# Patient Record
Sex: FEMALE | Race: OTHER | Employment: FULL TIME | URBAN - METROPOLITAN AREA
[De-identification: names, ages, dates, MRNs, and addresses within clinical notes are randomized per-mention and may not be internally consistent; named-entity substitution may affect disease eponyms.]

---

## 2017-02-01 ENCOUNTER — TELEPHONE (OUTPATIENT)
Dept: INTERNAL MEDICINE CLINIC | Facility: CLINIC | Age: 47
End: 2017-02-01

## 2017-02-01 NOTE — TELEPHONE ENCOUNTER
Pt stts that she may have the flu. She wanted to see Dr. Abi Plaza today but no appts and she did not want to see anyone else tomorrow. Would  like to prescribe?

## 2017-02-04 NOTE — TELEPHONE ENCOUNTER
Multiple attempts to contact pt and messages left without response.   If she call transfer to [id number]

## 2017-03-15 ENCOUNTER — APPOINTMENT (OUTPATIENT)
Dept: LAB | Age: 47
End: 2017-03-15
Attending: INTERNAL MEDICINE
Payer: COMMERCIAL

## 2017-03-15 ENCOUNTER — HOSPITAL ENCOUNTER (OUTPATIENT)
Dept: GENERAL RADIOLOGY | Age: 47
Discharge: HOME | End: 2017-03-15
Attending: INTERNAL MEDICINE
Payer: COMMERCIAL

## 2017-03-15 ENCOUNTER — OFFICE VISIT (OUTPATIENT)
Dept: INTERNAL MEDICINE CLINIC | Facility: CLINIC | Age: 47
End: 2017-03-15

## 2017-03-15 VITALS
HEART RATE: 62 BPM | WEIGHT: 155 LBS | DIASTOLIC BLOOD PRESSURE: 80 MMHG | BODY MASS INDEX: 29.27 KG/M2 | HEIGHT: 61 IN | SYSTOLIC BLOOD PRESSURE: 126 MMHG

## 2017-03-15 DIAGNOSIS — M25.511 ACUTE PAIN OF RIGHT SHOULDER: ICD-10-CM

## 2017-03-15 DIAGNOSIS — Z12.31 VISIT FOR SCREENING MAMMOGRAM: ICD-10-CM

## 2017-03-15 DIAGNOSIS — Z00.00 ROUTINE GENERAL MEDICAL EXAMINATION AT A HEALTH CARE FACILITY: ICD-10-CM

## 2017-03-15 DIAGNOSIS — Z00.00 ROUTINE GENERAL MEDICAL EXAMINATION AT A HEALTH CARE FACILITY: Primary | ICD-10-CM

## 2017-03-15 LAB
ALBUMIN SERPL BCP-MCNC: 4.3 G/DL (ref 3.5–4.8)
ALBUMIN/GLOB SERPL: 1.4 {RATIO} (ref 1–2)
ALP SERPL-CCNC: 76 U/L (ref 32–100)
ALT SERPL-CCNC: 29 U/L (ref 14–54)
ANION GAP SERPL CALC-SCNC: 8 MMOL/L (ref 0–18)
AST SERPL-CCNC: 28 U/L (ref 15–41)
BACTERIA UR QL AUTO: NEGATIVE /HPF
BILIRUB SERPL-MCNC: 1.1 MG/DL (ref 0.3–1.2)
BILIRUB UR QL: NEGATIVE
BUN SERPL-MCNC: 10 MG/DL (ref 8–20)
BUN/CREAT SERPL: 15.2 (ref 10–20)
CALCIUM SERPL-MCNC: 9.5 MG/DL (ref 8.5–10.5)
CHLORIDE SERPL-SCNC: 104 MMOL/L (ref 95–110)
CLARITY UR: CLEAR
CO2 SERPL-SCNC: 29 MMOL/L (ref 22–32)
COLOR UR: YELLOW
CREAT SERPL-MCNC: 0.66 MG/DL (ref 0.5–1.5)
ERYTHROCYTE [DISTWIDTH] IN BLOOD BY AUTOMATED COUNT: 14.1 % (ref 11–15)
GLOBULIN PLAS-MCNC: 3.1 G/DL (ref 2.5–3.7)
GLUCOSE SERPL-MCNC: 90 MG/DL (ref 70–99)
GLUCOSE UR-MCNC: NEGATIVE MG/DL
HBA1C MFR BLD: 4.7 % (ref 4–6)
HCT VFR BLD AUTO: 39.5 % (ref 35–48)
HGB BLD-MCNC: 13.2 G/DL (ref 12–16)
HGB UR QL STRIP.AUTO: NEGATIVE
KETONES UR-MCNC: NEGATIVE MG/DL
MCH RBC QN AUTO: 29.7 PG (ref 27–32)
MCHC RBC AUTO-ENTMCNC: 33.5 G/DL (ref 32–37)
MCV RBC AUTO: 88.4 FL (ref 80–100)
NITRITE UR QL STRIP.AUTO: NEGATIVE
OSMOLALITY UR CALC.SUM OF ELEC: 291 MOSM/KG (ref 275–295)
PH UR: 6 [PH] (ref 5–8)
PLATELET # BLD AUTO: 176 K/UL (ref 140–400)
PMV BLD AUTO: 8.7 FL (ref 7.4–10.3)
POTASSIUM SERPL-SCNC: 3.8 MMOL/L (ref 3.3–5.1)
PROT SERPL-MCNC: 7.4 G/DL (ref 5.9–8.4)
PROT UR-MCNC: NEGATIVE MG/DL
RBC # BLD AUTO: 4.46 M/UL (ref 3.7–5.4)
RBC #/AREA URNS AUTO: <1 /HPF
SODIUM SERPL-SCNC: 141 MMOL/L (ref 136–144)
SP GR UR STRIP: 1.01 (ref 1–1.03)
TSH SERPL-ACNC: 3.49 UIU/ML (ref 0.34–5.6)
UROBILINOGEN UR STRIP-ACNC: <2
VIT C UR-MCNC: NEGATIVE MG/DL
WBC # BLD AUTO: 5.2 K/UL (ref 4–11)
WBC #/AREA URNS AUTO: 1 /HPF

## 2017-03-15 PROCEDURE — 36415 COLL VENOUS BLD VENIPUNCTURE: CPT

## 2017-03-15 PROCEDURE — 73030 X-RAY EXAM OF SHOULDER: CPT

## 2017-03-15 PROCEDURE — 84443 ASSAY THYROID STIM HORMONE: CPT

## 2017-03-15 PROCEDURE — 81001 URINALYSIS AUTO W/SCOPE: CPT

## 2017-03-15 PROCEDURE — 83036 HEMOGLOBIN GLYCOSYLATED A1C: CPT

## 2017-03-15 PROCEDURE — 80053 COMPREHEN METABOLIC PANEL: CPT

## 2017-03-15 PROCEDURE — 99396 PREV VISIT EST AGE 40-64: CPT | Performed by: INTERNAL MEDICINE

## 2017-03-15 PROCEDURE — 85027 COMPLETE CBC AUTOMATED: CPT

## 2017-03-27 NOTE — PROGRESS NOTES
HPI:    Patient ID: German Khoury is a 50year old female.     HPI    Physical and shoulder pain      Right shoulder pain    Aggravated by movmement   Has decrease ROM ongoing for a few days    Due for wellnes exam  Due for mammogram    /80 mmHg  P Negative for adenopathy. Does not bruise/bleed easily. Psychiatric/Behavioral: Negative for behavioral problems and agitation. No current outpatient prescriptions on file.   Allergies:  Radiology Contrast *    Hives    Comment:And itching on samantha There is no tenderness. No hernia. Musculoskeletal: She exhibits no edema. Right shoulder: She exhibits decreased range of motion and tenderness. Lymphadenopathy:     She has no cervical adenopathy. Neurological: She is alert.    Skin: No rash

## 2017-04-07 ENCOUNTER — OFFICE VISIT (OUTPATIENT)
Dept: ORTHOPEDICS CLINIC | Facility: CLINIC | Age: 47
End: 2017-04-07

## 2017-04-07 ENCOUNTER — HOSPITAL ENCOUNTER (OUTPATIENT)
Dept: GENERAL RADIOLOGY | Facility: HOSPITAL | Age: 47
Discharge: HOME | End: 2017-04-07
Attending: ORTHOPAEDIC SURGERY
Payer: COMMERCIAL

## 2017-04-07 DIAGNOSIS — R52 PAIN: ICD-10-CM

## 2017-04-07 DIAGNOSIS — M75.21 BICEPS TENDONITIS, RIGHT: Primary | ICD-10-CM

## 2017-04-07 DIAGNOSIS — M50.30 DDD (DEGENERATIVE DISC DISEASE), CERVICAL: ICD-10-CM

## 2017-04-07 PROCEDURE — 73030 X-RAY EXAM OF SHOULDER: CPT

## 2017-04-07 PROCEDURE — 72040 X-RAY EXAM NECK SPINE 2-3 VW: CPT

## 2017-04-07 PROCEDURE — 99243 OFF/OP CNSLTJ NEW/EST LOW 30: CPT | Performed by: ORTHOPAEDIC SURGERY

## 2017-04-07 PROCEDURE — 99212 OFFICE O/P EST SF 10 MIN: CPT | Performed by: ORTHOPAEDIC SURGERY

## 2017-04-07 NOTE — PROGRESS NOTES
4/7/2017  Ashley Guajardo  3/12/1970  50year old   female  Jonatan Ortiz MD    HPI:   Patient presents with:  Consult: Pt is here for right shoulder pain. Pain for 1 1/2 months. Pain is getting worse. Pt was pulling items at work.  Repetative movem Packs/Day: 0.00  Years: 5         Types: Cigarettes      Quit date: 01/01/2001    Smokeless Status: Former User                       Alcohol Use: Yes           0.0 oz/week       0 Standard drinks or equivalent per week       Comment: rare          REVIEW therapy and follow-up in 4 weeks for repeat evaluation.   If the patient continues to have pain after physical therapy, we will consider further imaging of the right shoulder or referral to see a pain management physician for further evaluation of the cervi

## 2017-05-16 ENCOUNTER — APPOINTMENT (OUTPATIENT)
Dept: GENERAL RADIOLOGY | Facility: HOSPITAL | Age: 47
End: 2017-05-16
Payer: COMMERCIAL

## 2017-05-16 ENCOUNTER — HOSPITAL ENCOUNTER (OUTPATIENT)
Facility: HOSPITAL | Age: 47
Setting detail: OBSERVATION
Discharge: HOME | End: 2017-05-17
Admitting: HOSPITALIST
Payer: COMMERCIAL

## 2017-05-16 ENCOUNTER — TELEPHONE (OUTPATIENT)
Dept: INTERNAL MEDICINE CLINIC | Facility: CLINIC | Age: 47
End: 2017-05-16

## 2017-05-16 DIAGNOSIS — R07.9 ACUTE CHEST PAIN: Primary | ICD-10-CM

## 2017-05-16 PROCEDURE — 99219 INITIAL OBSERVATION CARE,LEVL II: CPT | Performed by: HOSPITALIST

## 2017-05-16 PROCEDURE — 71020 XR CHEST PA + LAT CHEST (CPT=71020): CPT

## 2017-05-16 RX ORDER — ENOXAPARIN SODIUM 100 MG/ML
40 INJECTION SUBCUTANEOUS DAILY
Status: DISCONTINUED | OUTPATIENT
Start: 2017-05-17 | End: 2017-05-17

## 2017-05-16 RX ORDER — ACETAMINOPHEN 325 MG/1
650 TABLET ORAL EVERY 6 HOURS PRN
Status: DISCONTINUED | OUTPATIENT
Start: 2017-05-16 | End: 2017-05-17

## 2017-05-16 RX ORDER — ONDANSETRON 2 MG/ML
4 INJECTION INTRAMUSCULAR; INTRAVENOUS EVERY 6 HOURS PRN
Status: DISCONTINUED | OUTPATIENT
Start: 2017-05-16 | End: 2017-05-17

## 2017-05-16 NOTE — TELEPHONE ENCOUNTER
RN f/u call: pt en route to [facility] ER. States  driving; chest discomfort has remained the same since we last spoke. Pt advised RN to f/u with her tomorrow.

## 2017-05-16 NOTE — TELEPHONE ENCOUNTER
Actions Requested: seeking appt; pt advised ER at this time  Situation/Background   Problem: acute sternal chest discomfort   Onset: 2 hours ago   Associated Symptoms: dyspnea at rest, dizziness.    History of Same: no   Precipitated By: none   Aggravating/ old and at least one cardiac risk factor (i.e., high blood pressure, diabetes, high cholesterol, obesity, smoker or strong family history of heart disease), pain is crushing, pressure-like, or heavy, took nitroglycerin and chest pain was not relieved, hist

## 2017-05-16 NOTE — ED INITIAL ASSESSMENT (HPI)
C/o chest heaviness and pressure started approx 3 hours ago while at work accompanied with sob, and sweating.

## 2017-05-17 ENCOUNTER — APPOINTMENT (OUTPATIENT)
Dept: CV DIAGNOSTICS | Facility: HOSPITAL | Age: 47
End: 2017-05-17
Attending: HOSPITALIST
Payer: COMMERCIAL

## 2017-05-17 VITALS
BODY MASS INDEX: 29.81 KG/M2 | WEIGHT: 157.88 LBS | DIASTOLIC BLOOD PRESSURE: 46 MMHG | HEIGHT: 61 IN | OXYGEN SATURATION: 98 % | RESPIRATION RATE: 14 BRPM | TEMPERATURE: 98 F | SYSTOLIC BLOOD PRESSURE: 110 MMHG | HEART RATE: 63 BPM

## 2017-05-17 PROCEDURE — 93350 STRESS TTE ONLY: CPT | Performed by: HOSPITALIST

## 2017-05-17 PROCEDURE — 99217 OBSERVATION CARE DISCHARGE: CPT | Performed by: HOSPITALIST

## 2017-05-17 PROCEDURE — 93017 CV STRESS TEST TRACING ONLY: CPT | Performed by: HOSPITALIST

## 2017-05-17 RX ORDER — 0.9 % SODIUM CHLORIDE 0.9 %
VIAL (ML) INJECTION
Status: COMPLETED
Start: 2017-05-17 | End: 2017-05-17

## 2017-05-17 NOTE — H&P
[id number] Patient Status:  Emergency    3/12/1970 MRN [id number]   Location [address] Attending Kellie Dawson MD   Hosp Day # 0 PCP Ferdinand Galvan MD     Date: Negative. Respiratory:  Negative  Cardiovascular: Chest pain  Gastrointestinal:  Negative. Genitourinary:  Negative  Endocrine:  Negative. Immunologic:  Negative. Musculoskeletal:  Negative. Integumentary:  Negative. Neurologic:  Negative.   Psychiatr ACS  Serial troponins so far have been negative, EKG with no acute changes. Patient given aspirin in ER will get stress test in a.m. to rule out angina. Hyperlipidemia  Continue statin.     Possible rotator cuff injury  Patient advised physical therapy

## 2017-05-17 NOTE — DISCHARGE SUMMARY
[facility]    Discharge Summary    Henry Quiroz Patient Status:  Observation    3/12/1970 MRN [id number]   Location [facility] Attending Estrella Mills MD   Hosp Day # 1 PCP Regla Collier MD     Date of Admission: 20 tolerated       Discharge Medications      Notice     You have not been prescribed any medications. Follow up: Follow-up Information     Follow up with Tate Harrison MD In 1 week.     Specialty:  Internal Medicine    Contact informatio

## 2017-05-17 NOTE — ED PROVIDER NOTES
Patient Seen in: [facility] AND [facility] Emergency Department    History   Patient presents with:  Chest Pain Angina (cardiovascular)    Stated Complaint: Acute chest pain    HPI    50year old female c/o ss chest pain on/off for an hour associated with sob and Non-toxic appearance. She does not have a sickly appearance. She does not appear ill. No distress. HENT:   Head: Normocephalic and atraumatic. Head is without raccoon's eyes, without right periorbital erythema and without left periorbital erythema.    Nos for these tests on the individual orders.    RAINBOW DRAW BLUE   RAINBOW DRAW GOLD   RAINBOW DRAW LAVENDER   RAINBOW DRAW LIGHT GREEN   RAINBOW DRAW LAVENDER TALL (BNP)   RAINBOW DRAW DARK GREEN   CBC W/ DIFFERENTIAL      EKG    Rate, intervals and axes as days, or Surgery in the Previous 4 weeks: NO   Previous, objectively diagnosed PE or DVT: NO   Hemoptysis:  NO   Malignancy w/ Treatment within 6 mo, or palliative:  no     Original/Primary Reference:   Joe Goodson DR, Morgan Hennessy, pulmonary embolism, as the risks of radiation exposure or other complication from unnecessary imaging prompted by high false positive rate of D-dimer testing do not outweigh the unlikely benefit.       Limitations of history:   able to obtain history from p patient.       Present on Admission  Date Reviewed: 4/7/2017          ICD-10-CM Noted POA    Acute chest pain R07.9 5/16/2017 Unknown          Condition upon leaving the department: Stable

## 2017-05-18 ENCOUNTER — TELEPHONE (OUTPATIENT)
Dept: INTERNAL MEDICINE UNIT | Facility: HOSPITAL | Age: 47
End: 2017-05-18

## 2017-05-18 NOTE — TELEPHONE ENCOUNTER
Patient discharged from [facility] on May 17,  2017.  Please call patient to schedule hospital follow-up appointment with PCP, .

## 2017-06-01 ENCOUNTER — OFFICE VISIT (OUTPATIENT)
Dept: INTERNAL MEDICINE CLINIC | Facility: CLINIC | Age: 47
End: 2017-06-01

## 2017-06-01 VITALS
WEIGHT: 160 LBS | RESPIRATION RATE: 16 BRPM | SYSTOLIC BLOOD PRESSURE: 102 MMHG | DIASTOLIC BLOOD PRESSURE: 63 MMHG | BODY MASS INDEX: 30 KG/M2 | TEMPERATURE: 98 F | HEART RATE: 61 BPM

## 2017-06-01 DIAGNOSIS — E78.5 HYPERLIPIDEMIA, UNSPECIFIED HYPERLIPIDEMIA TYPE: ICD-10-CM

## 2017-06-01 DIAGNOSIS — R07.89 CHEST PAIN, ATYPICAL: Primary | ICD-10-CM

## 2017-06-01 DIAGNOSIS — Z12.11 COLON CANCER SCREENING: ICD-10-CM

## 2017-06-01 PROCEDURE — 99214 OFFICE O/P EST MOD 30 MIN: CPT | Performed by: INTERNAL MEDICINE

## 2017-06-01 PROCEDURE — 99212 OFFICE O/P EST SF 10 MIN: CPT | Performed by: INTERNAL MEDICINE

## 2017-06-01 PROCEDURE — 1111F DSCHRG MED/CURRENT MED MERGE: CPT | Performed by: INTERNAL MEDICINE

## 2017-06-01 NOTE — PROGRESS NOTES
HPI:    Patient ID: Henry Quiroz is a 50year old female.     HPI  Date of Admission: 5/16/2017    Date of Discharge: 5/17/17    Admitting Diagnosis: Acute chest pain [R07.9]    Hospital Course:    Chest pain    - ACS ruled out    Review of Systems 10.0-20.0  30.9 (H) 15.2   CALCULATED OSMOLALITY      275-295 mOsm/kg  292 291   GFR, Non-      >=60  >60 >60   GFR, -American      >=60  >60 >60   WBC      4.0-11.0 K/UL  6.5 5.2   RBC      3.70-5.40 M/UL  4.15 4.46   Hemoglobin This score predicts a low risk of cardiac events. 2. Staged echo: Normal echo stress  Normal study after maximal exercise. No previous study was available for  Comparison.     17/2017  4:09 PM - Interface, [facility]       Narrative      ECG Report

## 2017-10-02 ENCOUNTER — TELEPHONE (OUTPATIENT)
Dept: GASTROENTEROLOGY | Facility: CLINIC | Age: 47
End: 2017-10-02

## 2017-10-02 NOTE — TELEPHONE ENCOUNTER
I left message on pt's voice mail requesting a call back. Need to update medical conditions, meds/allergies, insurance. It appears that last colonoscopy was normal, with exception of hemorrhoids.  I did note that family hx ovarian cancer (mom), and hx live

## 2017-10-05 NOTE — TELEPHONE ENCOUNTER
Pt's last colonoscopy was unremarkable outside of internal hemorrhoids, no colon polyps and no FH GI cancers. There is a FH ovarian CA but this is not usually enough to warrant a shorter recall time.      Nursing to f/u w/ pt

## 2017-10-05 NOTE — TELEPHONE ENCOUNTER
Chloe DUTTON in office, asking if there was actually a five year recall. I do not see any recalls, but went by pt's call back. I left voice message for pt to call me back. Is she just calling for 46 yr old screening? (pt is 50).   Last colonoscopy did not in

## 2017-10-05 NOTE — TELEPHONE ENCOUNTER
See below. Pt contacted and meds/allergies reconciled. No pacemaker, Not diabetic, no anticoagulant therapy. PLEASE NOTE--I BELIEVE THIS IS BEING REPEATED DUE TO FAMILY HX OVARIAN CANCER but may want to review.  I placed colon report and consult note from N

## 2017-10-20 NOTE — TELEPHONE ENCOUNTER
Pt contacted and reviewed [facility] message below. She states she understands but was urged by her PCP to have it repeated at 5 years d/t her extensive family hx of cancer.  She states   nephews of hers  of stomach cancer last year and the other this year, mot

## 2017-10-23 ENCOUNTER — TELEPHONE (OUTPATIENT)
Dept: OTHER | Age: 47
End: 2017-10-23

## 2017-10-23 NOTE — TELEPHONE ENCOUNTER
Dr. Sybil Schafer,     In reviewing the chart - pt's last colonoscopy was 2012 for further evaluation of left lower quadrant pain, change in bowel habits, and constipation. Last colonoscopy was unremarkable outside of hemorrhoids.     Patient has a family history

## 2017-10-23 NOTE — TELEPHONE ENCOUNTER
Action Requested: Summary for Provider     []  Critical Lab, Recommendations Needed  [] Need Additional Advice  [x]   FYI    []   Need Orders  [] Need Medications Sent to Pharmacy  []  Other     SUMMARY: FYI, Patient was made appt with Dr. Nirav Roe 11/3/17.

## 2017-10-24 NOTE — TELEPHONE ENCOUNTER
I have reviewed her chart, although no direct family history of colon cancer but there is extensive history of cancer in the family. I do not feel that it would be unreasonable to repeat colon at 5 years but insurance may not reimburse.   It does appear

## 2017-10-25 NOTE — TELEPHONE ENCOUNTER
Heron Carpenter,    I spoke to this pt over the phone    She was given Dr Leslie Petersen recommendation about repeating the Colonoscopy    She is going to call her insurance to see if they will cover a colonoscopy based on a FH of Ca, but not Colon Ca.     In the meantime,

## 2017-10-25 NOTE — TELEPHONE ENCOUNTER
Noted.  Will await patient's call back regarding the information from her insurance company. Will discuss scheduling colonoscopy at that time.

## 2017-11-03 ENCOUNTER — OFFICE VISIT (OUTPATIENT)
Dept: INTERNAL MEDICINE CLINIC | Facility: CLINIC | Age: 47
End: 2017-11-03

## 2017-11-03 VITALS
BODY MASS INDEX: 29.83 KG/M2 | DIASTOLIC BLOOD PRESSURE: 65 MMHG | WEIGHT: 158 LBS | HEIGHT: 61 IN | SYSTOLIC BLOOD PRESSURE: 100 MMHG | TEMPERATURE: 98 F | HEART RATE: 67 BPM

## 2017-11-03 DIAGNOSIS — Z12.4 SCREENING FOR CERVICAL CANCER: ICD-10-CM

## 2017-11-03 DIAGNOSIS — E78.5 HYPERLIPIDEMIA, UNSPECIFIED HYPERLIPIDEMIA TYPE: ICD-10-CM

## 2017-11-03 DIAGNOSIS — N39.41 URGE INCONTINENCE OF URINE: ICD-10-CM

## 2017-11-03 DIAGNOSIS — Z00.00 PHYSICAL EXAM: Primary | ICD-10-CM

## 2017-11-03 PROCEDURE — 90471 IMMUNIZATION ADMIN: CPT | Performed by: INTERNAL MEDICINE

## 2017-11-03 PROCEDURE — 81002 URINALYSIS NONAUTO W/O SCOPE: CPT | Performed by: INTERNAL MEDICINE

## 2017-11-03 PROCEDURE — 90686 IIV4 VACC NO PRSV 0.5 ML IM: CPT | Performed by: INTERNAL MEDICINE

## 2017-11-03 PROCEDURE — 99396 PREV VISIT EST AGE 40-64: CPT | Performed by: INTERNAL MEDICINE

## 2017-11-04 NOTE — PROGRESS NOTES
HPI:    Patient ID: Carla Mix is a 50year old female.     HPI    Physical exam    occas urinary incontinence  Urine leak with laughing or coughing    /65 (BP Location: Left arm, Patient Position: Sitting, Cuff Size: adult)   Pulse 67   Temp 97 APPENDECTOMY  No date: APPENDECTOMY   Family History   Problem Relation Age of Onset   • Prostate Cancer Father      (cause of death at age 62)   • Cancer Father    • Uterine Cancer Mother      (cause of death at age 80)   • Cancer Mother    • Ovarian Canc cervical adenopathy. She has no axillary adenopathy. Neurological: She is alert. Skin: No rash noted. She is not diaphoretic. No erythema. Nursing note and vitals reviewed.     Component      Latest Ref Rng & Units 5/16/2017 5/16/2017 3/15/2017 URINE-COLOR      Yellow   Yellow   CLARITY URINE      Clear   Clear   SPECIFIC GRAVITY      1.002 - 1.035   1.014   PH, URINE      5.0 - 8.0   6.0   PROTEIN (URINE DIPSTICK)      Negative mg/dL   Negative   GLUCOSE (URINE DIPSTICK)      Negative mg/dL

## 2018-06-15 ENCOUNTER — OFFICE VISIT (OUTPATIENT)
Dept: INTERNAL MEDICINE CLINIC | Facility: CLINIC | Age: 48
End: 2018-06-15

## 2018-06-15 ENCOUNTER — TELEPHONE (OUTPATIENT)
Dept: INTERNAL MEDICINE CLINIC | Facility: CLINIC | Age: 48
End: 2018-06-15

## 2018-06-15 VITALS
DIASTOLIC BLOOD PRESSURE: 74 MMHG | HEIGHT: 61 IN | HEART RATE: 72 BPM | SYSTOLIC BLOOD PRESSURE: 121 MMHG | BODY MASS INDEX: 29.83 KG/M2 | TEMPERATURE: 98 F | WEIGHT: 158 LBS

## 2018-06-15 DIAGNOSIS — H11.32 SUBCONJUNCTIVAL HEMORRHAGE OF LEFT EYE: Primary | ICD-10-CM

## 2018-06-15 PROCEDURE — 99212 OFFICE O/P EST SF 10 MIN: CPT | Performed by: INTERNAL MEDICINE

## 2018-06-15 PROCEDURE — 99213 OFFICE O/P EST LOW 20 MIN: CPT | Performed by: INTERNAL MEDICINE

## 2018-06-15 NOTE — TELEPHONE ENCOUNTER
Pt is requesting a back to work note with no restrictions faxed to [phone]. Please advise. Slovenian speaking.

## 2018-06-18 NOTE — PROGRESS NOTES
HPI:    Patient ID: Jocelynn Alfaro is a 46year old female.     HPI    Left eye redness  No pain no swelling no visual defect    /74 (BP Location: Right arm, Patient Position: Sitting, Cuff Size: adult)   Pulse 72   Temp 97.9 °F (36.6 °C) (Oral)   H eye  (primary encounter diagnosis)  Plan: discussed benign nature  Monitor  Follow up with opthalmology if worsening or not imprivng iwthin the next 7 to 10 days  Patient given a note to return to work    Patient voiced understanding  and agrees with plan

## 2018-12-27 ENCOUNTER — OFFICE VISIT (OUTPATIENT)
Dept: FAMILY MEDICINE CLINIC | Facility: CLINIC | Age: 48
End: 2018-12-27
Payer: COMMERCIAL

## 2018-12-27 ENCOUNTER — NURSE TRIAGE (OUTPATIENT)
Dept: OTHER | Age: 48
End: 2018-12-27

## 2018-12-27 VITALS
TEMPERATURE: 98 F | SYSTOLIC BLOOD PRESSURE: 117 MMHG | BODY MASS INDEX: 29.83 KG/M2 | HEIGHT: 61 IN | WEIGHT: 158 LBS | HEART RATE: 79 BPM | DIASTOLIC BLOOD PRESSURE: 69 MMHG

## 2018-12-27 DIAGNOSIS — J06.9 VIRAL UPPER RESPIRATORY INFECTION: ICD-10-CM

## 2018-12-27 DIAGNOSIS — J02.9 SORE THROAT: Primary | ICD-10-CM

## 2018-12-27 PROBLEM — R07.9 ACUTE CHEST PAIN: Status: RESOLVED | Noted: 2017-05-16 | Resolved: 2018-12-27

## 2018-12-27 PROCEDURE — 99213 OFFICE O/P EST LOW 20 MIN: CPT | Performed by: PHYSICIAN ASSISTANT

## 2018-12-27 PROCEDURE — 87880 STREP A ASSAY W/OPTIC: CPT | Performed by: PHYSICIAN ASSISTANT

## 2018-12-27 PROCEDURE — 99212 OFFICE O/P EST SF 10 MIN: CPT | Performed by: PHYSICIAN ASSISTANT

## 2018-12-27 RX ORDER — BENZONATATE 100 MG/1
100 CAPSULE ORAL 3 TIMES DAILY PRN
Qty: 30 CAPSULE | Refills: 0 | Status: SHIPPED | OUTPATIENT
Start: 2018-12-27 | End: 2019-09-09 | Stop reason: ALTCHOICE

## 2018-12-27 NOTE — PROGRESS NOTES
HPI: URI    This is a new problem. The current episode started in the past 7 days. The problem has been gradually improving. There has been no fever. Associated symptoms include congestion, coughing, rhinorrhea and a sore throat.  Pertinent negatives in insecurity - inability: Not on file      Transportation needs - medical: Not on file      Transportation needs - non-medical: Not on file    Occupational History      Not on file    Tobacco Use      Smoking status: Former Smoker        Years: 5.00        T Exam   Vitals reviewed. Constitutional: She is oriented to person, place, and time. She appears well-developed and well-nourished. She is cooperative. No distress. HENT:   Head: Normocephalic and atraumatic.    Right Ear: Tympanic membrane and ear canal

## 2018-12-27 NOTE — TELEPHONE ENCOUNTER
Please reply to pool: EM RN TRIAGE  Action Requested: Summary for Provider     []  Critical Lab, Recommendations Needed  [] Need Additional Advice  []   FYI    []   Need Orders  [] Need Medications Sent to Pharmacy  []  Other     SUMMARY: Appointment made

## 2018-12-28 NOTE — ASSESSMENT & PLAN NOTE
Supportive measures discussed, may use Tylenol/Motrin as needed for fever/pain. Increase fluids and rest, warm showers/baths for comfort, steam for congestion. Advise patient to take Zyrtec 10 mg QD and Flonase for runny nose and nasal congestion.  Tessalon

## 2019-09-09 ENCOUNTER — APPOINTMENT (OUTPATIENT)
Dept: LAB | Age: 49
End: 2019-09-09
Attending: INTERNAL MEDICINE
Payer: COMMERCIAL

## 2019-09-09 ENCOUNTER — OFFICE VISIT (OUTPATIENT)
Dept: INTERNAL MEDICINE CLINIC | Facility: CLINIC | Age: 49
End: 2019-09-09
Payer: COMMERCIAL

## 2019-09-09 VITALS
HEART RATE: 56 BPM | DIASTOLIC BLOOD PRESSURE: 69 MMHG | HEIGHT: 61 IN | WEIGHT: 162 LBS | SYSTOLIC BLOOD PRESSURE: 110 MMHG | RESPIRATION RATE: 20 BRPM | BODY MASS INDEX: 30.58 KG/M2 | TEMPERATURE: 98 F

## 2019-09-09 DIAGNOSIS — Z12.31 VISIT FOR SCREENING MAMMOGRAM: ICD-10-CM

## 2019-09-09 DIAGNOSIS — D22.9 NUMEROUS MOLES: ICD-10-CM

## 2019-09-09 DIAGNOSIS — Z01.419 GYNECOLOGIC EXAM NORMAL: ICD-10-CM

## 2019-09-09 DIAGNOSIS — Z00.00 ROUTINE GENERAL MEDICAL EXAMINATION AT A HEALTH CARE FACILITY: Primary | ICD-10-CM

## 2019-09-09 DIAGNOSIS — Z12.11 COLON CANCER SCREENING: ICD-10-CM

## 2019-09-09 DIAGNOSIS — Z00.00 ROUTINE GENERAL MEDICAL EXAMINATION AT A HEALTH CARE FACILITY: ICD-10-CM

## 2019-09-09 DIAGNOSIS — Z80.41 FAMILY HISTORY OF OVARIAN CANCER: ICD-10-CM

## 2019-09-09 LAB
ALBUMIN SERPL-MCNC: 4.2 G/DL (ref 3.4–5)
ALBUMIN/GLOB SERPL: 1.3 {RATIO} (ref 1–2)
ALP LIVER SERPL-CCNC: 65 U/L (ref 41–108)
ALT SERPL-CCNC: 49 U/L (ref 13–56)
ANION GAP SERPL CALC-SCNC: 2 MMOL/L (ref 0–18)
AST SERPL-CCNC: 24 U/L (ref 15–37)
BILIRUB SERPL-MCNC: 0.6 MG/DL (ref 0.1–2)
BUN BLD-MCNC: 11 MG/DL (ref 7–18)
BUN/CREAT SERPL: 18.6 (ref 10–20)
CALCIUM BLD-MCNC: 9.2 MG/DL (ref 8.5–10.1)
CHLORIDE SERPL-SCNC: 111 MMOL/L (ref 98–112)
CHOLEST SMN-MCNC: 174 MG/DL (ref ?–200)
CO2 SERPL-SCNC: 31 MMOL/L (ref 21–32)
CREAT BLD-MCNC: 0.59 MG/DL (ref 0.55–1.02)
DEPRECATED RDW RBC AUTO: 45.9 FL (ref 35.1–46.3)
ERYTHROCYTE [DISTWIDTH] IN BLOOD BY AUTOMATED COUNT: 14.1 % (ref 11–15)
EST. AVERAGE GLUCOSE BLD GHB EST-MCNC: 97 MG/DL (ref 68–126)
GLOBULIN PLAS-MCNC: 3.2 G/DL (ref 2.8–4.4)
GLUCOSE BLD-MCNC: 83 MG/DL (ref 70–99)
HBA1C MFR BLD HPLC: 5 % (ref ?–5.7)
HCT VFR BLD AUTO: 37.2 % (ref 35–48)
HDLC SERPL-MCNC: 49 MG/DL (ref 40–59)
HGB BLD-MCNC: 12.7 G/DL (ref 12–16)
LDLC SERPL CALC-MCNC: 95 MG/DL (ref ?–100)
M PROTEIN MFR SERPL ELPH: 7.4 G/DL (ref 6.4–8.2)
MCH RBC QN AUTO: 30.8 PG (ref 26–34)
MCHC RBC AUTO-ENTMCNC: 34.1 G/DL (ref 31–37)
MCV RBC AUTO: 90.1 FL (ref 80–100)
NONHDLC SERPL-MCNC: 125 MG/DL (ref ?–130)
OSMOLALITY SERPL CALC.SUM OF ELEC: 297 MOSM/KG (ref 275–295)
PATIENT FASTING: YES
PATIENT FASTING: YES
PLATELET # BLD AUTO: 179 10(3)UL (ref 150–450)
POTASSIUM SERPL-SCNC: 4.6 MMOL/L (ref 3.5–5.1)
RBC # BLD AUTO: 4.13 X10(6)UL (ref 3.8–5.3)
RBC #/AREA URNS AUTO: <1 /HPF
SODIUM SERPL-SCNC: 144 MMOL/L (ref 136–145)
T4 FREE SERPL-MCNC: 0.9 NG/DL (ref 0.8–1.7)
TRIGL SERPL-MCNC: 151 MG/DL (ref 30–149)
TSI SER-ACNC: 1.94 MIU/ML (ref 0.36–3.74)
VLDLC SERPL CALC-MCNC: 30 MG/DL (ref 0–30)
WBC # BLD AUTO: 5.2 X10(3) UL (ref 4–11)
WBC #/AREA URNS AUTO: 1 /HPF

## 2019-09-09 PROCEDURE — 36415 COLL VENOUS BLD VENIPUNCTURE: CPT

## 2019-09-09 PROCEDURE — 84443 ASSAY THYROID STIM HORMONE: CPT

## 2019-09-09 PROCEDURE — 85027 COMPLETE CBC AUTOMATED: CPT

## 2019-09-09 PROCEDURE — 80061 LIPID PANEL: CPT

## 2019-09-09 PROCEDURE — 83036 HEMOGLOBIN GLYCOSYLATED A1C: CPT

## 2019-09-09 PROCEDURE — 99396 PREV VISIT EST AGE 40-64: CPT | Performed by: INTERNAL MEDICINE

## 2019-09-09 PROCEDURE — 81015 MICROSCOPIC EXAM OF URINE: CPT

## 2019-09-09 PROCEDURE — 84439 ASSAY OF FREE THYROXINE: CPT

## 2019-09-09 PROCEDURE — 80053 COMPREHEN METABOLIC PANEL: CPT

## 2019-09-09 NOTE — PROGRESS NOTES
HPI:    Patient ID: Nata Rodriguez is a 44year old female.     HPI    Examination physical    Pap smear was about 2 years ago patient mother had ovarian cancer    She is due for colon cancer screening    She is due for blood work and mammogram  /69 High cholesterol    • Hyperlipidemia    • Lipid screening 10/25/2010    Per [employer].       Past Surgical History:   Procedure Laterality Date   • APPENDECTOMY     • APPENDECTOMY        Family History   Problem Relation Age of Onset   • Prostate Cancer Fathe tenderness. Lymphadenopathy:     She has no cervical adenopathy. Neurological: She is alert. Skin: No rash noted. She is not diaphoretic. No erythema. Nursing note and vitals reviewed.            ASSESSMENT/PLAN:   (Z00.00) Routine general medical e

## 2019-09-13 ENCOUNTER — TELEPHONE (OUTPATIENT)
Dept: HEMATOLOGY/ONCOLOGY | Facility: HOSPITAL | Age: 49
End: 2019-09-13

## 2019-09-18 ENCOUNTER — TELEPHONE (OUTPATIENT)
Dept: DERMATOLOGY CLINIC | Facility: CLINIC | Age: 49
End: 2019-09-18

## 2019-09-18 ENCOUNTER — APPOINTMENT (OUTPATIENT)
Dept: LAB | Age: 49
End: 2019-09-18
Attending: DERMATOLOGY
Payer: COMMERCIAL

## 2019-09-18 ENCOUNTER — OFFICE VISIT (OUTPATIENT)
Dept: DERMATOLOGY CLINIC | Facility: CLINIC | Age: 49
End: 2019-09-18
Payer: COMMERCIAL

## 2019-09-18 DIAGNOSIS — R21 RASH AND OTHER NONSPECIFIC SKIN ERUPTION: ICD-10-CM

## 2019-09-18 DIAGNOSIS — L81.6 POIKILODERMA: ICD-10-CM

## 2019-09-18 DIAGNOSIS — L81.9 DYSCHROMIA: Primary | ICD-10-CM

## 2019-09-18 DIAGNOSIS — L81.9 DYSCHROMIA: ICD-10-CM

## 2019-09-18 DIAGNOSIS — D23.9 BENIGN NEOPLASM OF SKIN, UNSPECIFIED LOCATION: ICD-10-CM

## 2019-09-18 PROCEDURE — 99203 OFFICE O/P NEW LOW 30 MIN: CPT | Performed by: DERMATOLOGY

## 2019-09-18 PROCEDURE — 86038 ANTINUCLEAR ANTIBODIES: CPT

## 2019-09-18 PROCEDURE — 36415 COLL VENOUS BLD VENIPUNCTURE: CPT

## 2019-09-18 RX ORDER — MOMETASONE FUROATE 1 MG/G
1 CREAM TOPICAL DAILY
Qty: 60 G | Refills: 3 | Status: SHIPPED | OUTPATIENT
Start: 2019-09-18 | End: 2020-01-31

## 2019-09-18 RX ORDER — AMMONIUM LACTATE 12 G/100G
1 CREAM TOPICAL 2 TIMES DAILY
Qty: 385 G | Refills: 11 | Status: SHIPPED | OUTPATIENT
Start: 2019-09-18 | End: 2019-10-18

## 2019-09-18 NOTE — TELEPHONE ENCOUNTER
Ammonium Lactate (LAC-HYDRIN) 12 % External Cream  Mometasone Furoate 0.1 % External Cream  Please clarify for both-    Area of application-    For the Mometasone please clarify as well-  How long it will last/day supply

## 2019-09-19 LAB — NUCLEAR IGG TITR SER IF: NEGATIVE {TITER}

## 2019-09-25 ENCOUNTER — GENETICS ENCOUNTER (OUTPATIENT)
Dept: GENETICS | Facility: HOSPITAL | Age: 49
End: 2019-09-25
Attending: GENETIC COUNSELOR, MS
Payer: COMMERCIAL

## 2019-09-25 DIAGNOSIS — Z80.0 FAMILY HISTORY OF MALIGNANT NEOPLASM OF DIGESTIVE ORGAN: Primary | ICD-10-CM

## 2019-09-25 PROCEDURE — 96040 MEDICAL GENETICS COUNSELING EA 30 MIN: CPT | Performed by: GENETIC COUNSELOR, MS

## 2019-09-25 NOTE — PROGRESS NOTES
Reason for visit: Mrs. Rajani Lai is a 47-year-old woman who was referred for genetic counseling due to her family history of various cancers.   She was seen in clinic today to discuss her history as well as consider the option of genetic testing, if appropriat history of breast biopsies or abnormal mammograms. She denies any gynecologic surgery to date and has her ovaries and uterus intact. Mrs. Liang Espinosa has had a colonoscopy with normal results at age 34 and plans to have another one shortly, given her family h Inheriting the mutation does not automatically mean that one will develop cancer, rather that there is an increased chance for developing certain types of cancer.   Cancer predisposing gene mutations can exist in males and females and can be passed on to darell concerned with the risk to carry a genetic alteration which may predispose her to cancer and was not interested in pursuing any genetic testing at this point. Plan:  1. Mrs. Charlene Jiang plans to continue with her screening for cancer, as recommended by her p

## 2019-09-27 ENCOUNTER — HOSPITAL ENCOUNTER (OUTPATIENT)
Dept: MAMMOGRAPHY | Age: 49
Discharge: HOME | End: 2019-09-27
Attending: INTERNAL MEDICINE
Payer: COMMERCIAL

## 2019-09-27 DIAGNOSIS — Z12.31 VISIT FOR SCREENING MAMMOGRAM: ICD-10-CM

## 2019-09-27 PROCEDURE — 77063 BREAST TOMOSYNTHESIS BI: CPT | Performed by: INTERNAL MEDICINE

## 2019-09-27 PROCEDURE — 77067 SCR MAMMO BI INCL CAD: CPT | Performed by: INTERNAL MEDICINE

## 2019-09-29 NOTE — PROGRESS NOTES
Carla Mix is a 44year old female. Patient presents with:  Lesion: LOV 3/2/2012 (New Patient) present with skin tag on bilateral eyes . Patient c/o having skin tags for 6 months .  Patient denies any hx of sc  Derm Problem: Patient present with Not on file      Highest education level: Not on file    Occupational History      Not on file    Social Needs      Financial resource strain: Not on file      Food insecurity:        Worry: Not on file        Inability: Not on file      Transportation nee Asked        Breast feeding: Not Asked        Reaction to local anesthetic: No    Social History Narrative      Not on file    Family History   Problem Relation Age of Onset   • Prostate Cancer Father 62        (cause of death)   • Cancer Father    • Uteri noted in the the HPI. No fevers, chills, night sweats, photosensitivity, lymph node swelling. No other skin complaints. Physical examination:  Well-developed well-nourished patient alert oriented in no acute distress.       Exam performed, including above therapy. Seborrheic keratoses, multiple pedunculated papules consistent skin tags on the pe await response. Await labs. Riorbital area follow-up with ophthalmology for removal.  Trial of cryo to largest lesions bothersome.   RTC as noted    The p

## 2019-10-10 ENCOUNTER — OFFICE VISIT (OUTPATIENT)
Dept: OBGYN CLINIC | Facility: CLINIC | Age: 49
End: 2019-10-10
Payer: COMMERCIAL

## 2019-10-10 VITALS
SYSTOLIC BLOOD PRESSURE: 108 MMHG | BODY MASS INDEX: 29.6 KG/M2 | DIASTOLIC BLOOD PRESSURE: 64 MMHG | HEIGHT: 62.5 IN | HEART RATE: 68 BPM | WEIGHT: 165 LBS

## 2019-10-10 DIAGNOSIS — Z11.3 SCREEN FOR STD (SEXUALLY TRANSMITTED DISEASE): ICD-10-CM

## 2019-10-10 DIAGNOSIS — Z01.419 ENCOUNTER FOR GYNECOLOGICAL EXAMINATION WITHOUT ABNORMAL FINDING: Primary | ICD-10-CM

## 2019-10-10 PROCEDURE — 99386 PREV VISIT NEW AGE 40-64: CPT | Performed by: OBSTETRICS & GYNECOLOGY

## 2019-10-10 NOTE — PROGRESS NOTES
Iliana Castanon is a 44year old female [id number] Patient's last menstrual period was 03/15/2016 (approximate). Patient presents with:  Gyn Exam: ANNUAL EXAM -- new pt. No  bleed  Std Screen: wishes for full screen  .     OBSTETRICS HISTORY:  OB History connections:        Talks on phone: Not on file        Gets together: Not on file        Attends Jehov[employer] witness service: Not on file        Active member of club or organization: Not on file        Attends meetings of clubs or organizations: Not on file g, Rfl: 3  •  Ammonium Lactate (LAC-HYDRIN) 12 % External Cream, Apply 1 Application topically 2 (two) times daily for 60 doses. , Disp: 385 g, Rfl: 11    ALLERGIES:    Radiology Contrast *    HIVES    Comment:And itching on back and legs      Review of Sys tenderness  Adnexa:   normal without masses or tenderness  Perineum:   normal  Anus: no hemorroids     Assessment & Plan:  Caitlin Sethi was seen today for gyn exam and std screen.     Diagnoses and all orders for this visit:    Encounter for gynecological exam

## 2020-01-03 ENCOUNTER — TELEPHONE (OUTPATIENT)
Dept: GASTROENTEROLOGY | Facility: CLINIC | Age: 50
End: 2020-01-03

## 2020-01-03 ENCOUNTER — OFFICE VISIT (OUTPATIENT)
Dept: GASTROENTEROLOGY | Facility: CLINIC | Age: 50
End: 2020-01-03
Payer: COMMERCIAL

## 2020-01-03 VITALS
BODY MASS INDEX: 30.18 KG/M2 | SYSTOLIC BLOOD PRESSURE: 106 MMHG | HEART RATE: 80 BPM | WEIGHT: 168.19 LBS | HEIGHT: 62.5 IN | DIASTOLIC BLOOD PRESSURE: 69 MMHG

## 2020-01-03 DIAGNOSIS — K21.9 GASTROESOPHAGEAL REFLUX DISEASE, ESOPHAGITIS PRESENCE NOT SPECIFIED: ICD-10-CM

## 2020-01-03 DIAGNOSIS — R10.32 LLQ ABDOMINAL PAIN: ICD-10-CM

## 2020-01-03 DIAGNOSIS — R10.13 DYSPEPSIA: ICD-10-CM

## 2020-01-03 DIAGNOSIS — Z12.11 COLON CANCER SCREENING: Primary | ICD-10-CM

## 2020-01-03 DIAGNOSIS — Z80.0 FAMILY HISTORY OF GASTRIC CANCER: Primary | ICD-10-CM

## 2020-01-03 PROCEDURE — 99241 OFFICE CONSULTATION,LEVEL I: CPT | Performed by: INTERNAL MEDICINE

## 2020-01-03 NOTE — PROGRESS NOTES
HPI:    Patient ID: Charbel Francisco is a 44year old female.     Wt Readings from Last 20 Encounters:  01/03/20 : 168 lb 3.2 oz (76.3 kg)  10/10/19 : 165 lb (74.8 kg)  09/09/19 : 162 lb (73.5 kg)  12/27/18 : 158 lb (71.7 kg)  06/15/18 : 158 lb (71.7 kg)  1 described in the left ovary. The patient does have a family history of ovarian cancer in her mother.     The patient denies any upper GI symptoms, no GERD or dysphagia. The pain or discomfort does not seem to alter with eating.  She describes her diet as h

## 2020-01-03 NOTE — PROGRESS NOTES
HPI:    Patient ID: Los Amaro is a 44year old woman very fit and healthy, who likes to run. Very busy active woman, just adopted a 8year-old child possibly from another family member.     Ms. Koko Sifuentes is referred back to us by Dr. Linda Faye for scree the affected area(s). (Patient not taking: Reported on 1/3/2020 ) 60 g 3     Allergies:  Radiology Contrast *    HIVES    Comment:And itching on back and legs  Imaging: No results found.      PHYSICAL EXAM:   Physical Exam   Constitutional: She is oriented escort home was also discussed. GoLYTELY bowel prep    Dx = GERD, dyspepsia, LLQ abd pain, screening CLN      No orders of the defined types were placed in this encounter.       Meds This Visit:  Requested Prescriptions      No prescriptions requested or

## 2020-01-03 NOTE — PATIENT INSTRUCTIONS
Schedule EGD & colonoscopy exam at [facility])/ JOSIE    This patient IS appropriate for the [facility] endoscopy center. Body mass index is 30.27 kg/m².     IV sedation (conscious sedation) if [facility]  MAC anesthesia if

## 2020-01-05 PROBLEM — J06.9 VIRAL UPPER RESPIRATORY INFECTION: Status: RESOLVED | Noted: 2018-12-27 | Resolved: 2020-01-05

## 2020-01-05 PROBLEM — Z80.0 FAMILY HISTORY OF GASTRIC CANCER: Status: ACTIVE | Noted: 2020-01-05

## 2020-01-31 ENCOUNTER — TELEPHONE (OUTPATIENT)
Dept: FAMILY MEDICINE CLINIC | Facility: CLINIC | Age: 50
End: 2020-01-31

## 2020-01-31 ENCOUNTER — OFFICE VISIT (OUTPATIENT)
Dept: FAMILY MEDICINE CLINIC | Facility: CLINIC | Age: 50
End: 2020-01-31
Payer: COMMERCIAL

## 2020-01-31 ENCOUNTER — NURSE TRIAGE (OUTPATIENT)
Dept: INTERNAL MEDICINE CLINIC | Facility: CLINIC | Age: 50
End: 2020-01-31

## 2020-01-31 VITALS
SYSTOLIC BLOOD PRESSURE: 132 MMHG | WEIGHT: 168 LBS | BODY MASS INDEX: 30.14 KG/M2 | HEIGHT: 62.5 IN | HEART RATE: 88 BPM | DIASTOLIC BLOOD PRESSURE: 71 MMHG

## 2020-01-31 DIAGNOSIS — L29.0 RECTAL ITCHING: ICD-10-CM

## 2020-01-31 DIAGNOSIS — N30.01 ACUTE CYSTITIS WITH HEMATURIA: Primary | ICD-10-CM

## 2020-01-31 LAB
MULTISTIX LOT#: NORMAL NUMERIC
PH, URINE: 6 (ref 4.5–8)
SPECIFIC GRAVITY: 1.02 (ref 1–1.03)
UROBILINOGEN,SEMI-QN: 0.2 MG/DL (ref 0–1.9)

## 2020-01-31 PROCEDURE — 81002 URINALYSIS NONAUTO W/O SCOPE: CPT | Performed by: NURSE PRACTITIONER

## 2020-01-31 PROCEDURE — 99213 OFFICE O/P EST LOW 20 MIN: CPT | Performed by: NURSE PRACTITIONER

## 2020-01-31 RX ORDER — NITROFURANTOIN 25; 75 MG/1; MG/1
100 CAPSULE ORAL 2 TIMES DAILY
Qty: 14 CAPSULE | Refills: 0 | Status: SHIPPED | OUTPATIENT
Start: 2020-01-31 | End: 2020-02-19

## 2020-01-31 NOTE — PROGRESS NOTES
HPI  Pt here for pain and burning with urination, suprapubic discomfort. Notice blood in urine this am.     Has had some rectal itching for the past 2 weeks. Has not noticed any hemorrhoids. Denies constipation or diarrhea.  Has colonoscopy next month    Re Inability: Not on file      Transportation needs:        Medical: Not on file        Non-medical: Not on file    Tobacco Use      Smoking status: Former Smoker        Years: 5.00        Types: Cigarettes        Quit date: 1/1/2001        Years since [employer] Monohyd Macro 100 MG Oral Cap Take 1 capsule (100 mg total) by mouth 2 (two) times daily. 14 capsule 0   • hydrocortisone 2.5 % Rectal Cream Place 1 Application rectally 2 (two) times daily.  1 Tube 2       Allergies:    Radiology Contrast *    HIVES    Com

## 2020-01-31 NOTE — TELEPHONE ENCOUNTER
hydrocortisone 2.5 % Rectal Cream    David Wylie  from [address] [place] [address] Street calling need to know how long patient is to use mediation      Please advise   [phone]

## 2020-01-31 NOTE — PATIENT INSTRUCTIONS
URINARY TRACT INFECTION    -encourage fluids 8-12 glasses of non-caffeinated fluids/day  -encourage drinking cranberry juice  -urinate after intercourse  -keep good hygiene, avoid harsh soaps and lotions to perineal area  -females-wipe front to back  -mayra out.  · The urethra carries urine from the bladder out of the body. It is shorter in women, so bacteria can move through it more easily. The urethra is longer in men, so a UTI is less likely to reach the bladder or kidneys in men.   Date Last Reviewed: 1/1/

## 2020-01-31 NOTE — TELEPHONE ENCOUNTER
Action Requested: Summary for Provider     []  Critical Lab, Recommendations Needed  [] Need Additional Advice  []   FYI    []   Need Orders  [] Need Medications Sent to Pharmacy  []  Other     SUMMARY: Per protocol advised OV today and pt scheduled for 11

## 2020-02-01 NOTE — TELEPHONE ENCOUNTER
Aayush Rhoades, please see message below, advise.     Outpatient Medication Detail      Disp Refills Start End    hydrocortisone 2.5 % Rectal Cream 1 Tube 2 1/31/2020     Sig - Route: Place 1 Application rectally 2 (two) times daily. - Rectal

## 2020-02-19 ENCOUNTER — TELEPHONE (OUTPATIENT)
Dept: INTERNAL MEDICINE CLINIC | Facility: CLINIC | Age: 50
End: 2020-02-19

## 2020-02-19 ENCOUNTER — OFFICE VISIT (OUTPATIENT)
Dept: INTERNAL MEDICINE CLINIC | Facility: CLINIC | Age: 50
End: 2020-02-19
Payer: COMMERCIAL

## 2020-02-19 VITALS
SYSTOLIC BLOOD PRESSURE: 120 MMHG | DIASTOLIC BLOOD PRESSURE: 77 MMHG | BODY MASS INDEX: 30 KG/M2 | TEMPERATURE: 102 F | HEART RATE: 106 BPM | WEIGHT: 164 LBS

## 2020-02-19 DIAGNOSIS — J11.1 FLU SYNDROME: Primary | ICD-10-CM

## 2020-02-19 LAB
FLUAV + FLUBV RNA SPEC NAA+PROBE: NEGATIVE
FLUAV + FLUBV RNA SPEC NAA+PROBE: NEGATIVE
FLUAV + FLUBV RNA SPEC NAA+PROBE: POSITIVE

## 2020-02-19 PROCEDURE — 99214 OFFICE O/P EST MOD 30 MIN: CPT | Performed by: INTERNAL MEDICINE

## 2020-02-19 RX ORDER — OSELTAMIVIR PHOSPHATE 75 MG/1
75 CAPSULE ORAL 2 TIMES DAILY
Qty: 10 CAPSULE | Refills: 0 | Status: SHIPPED | OUTPATIENT
Start: 2020-02-19 | End: 2020-02-24

## 2020-02-19 NOTE — PROGRESS NOTES
History of Present Illness   Patient ID: Los Amaro is a 44year old female. Chief Complaint: Body ache and/or chills (c/o cough, headache, weakness. )      Flu   This is a new problem. The current episode started yesterday.  The problem occurs const Cardiovascular: Normal rate and normal heart sounds. Pulmonary/Chest: Effort normal and breath sounds normal.   Abdominal: Soft.  Bowel sounds are normal.   Lymphadenopathy:        Head (right side): No submental, no submandibular and no tonsillar adenop Is BP treated: No      HDL Cholesterol: 49 mg/dL      Total Cholesterol: 174 mg/dL    Medical History    Reviewed Active Problems:  Patient Active Problem List    Acute cystitis with hematuria      Rectal itching      Family history of gastric cancer As above, tylenol for fever, work note given, rest, hydrate, symptomatic treatment discussed. Follow Up:   No follow-ups on file. Diamond Kaye MD  Internal Medicine       Call office with any questions or seek emergency care if necessary.    Juan · Over-the-counter cold medicines will not make the flu go away faster. But the medicines may help with coughing, sore throat, and congestion in your nose and sinuses. Don’t use a decongestant if you have high blood pressure.   · Stay home until your fever Take this medicine by mouth with a glass of water. Follow the directions on the prescription label. Start this medicine at the first sign of flu symptoms. You can take it with or without food. If it upsets your stomach, take it with food.  Take your medicin · immune system problems  · kidney disease  · liver disease  · lung disease  · an unusual or allergic reaction to oseltamivir, other medicines, foods, dyes, or preservatives  · pregnant or trying to get pregnant  · breast-feeding  What should I watch for w

## 2020-02-19 NOTE — PATIENT INSTRUCTIONS
Influenza (Adult)    Influenza is also called the flu. It is a viral illness that affects the air passages of your lungs. It is different from the common cold. The flu can easily be passed from one to person to another.  It may be spread through the air b If you are age 72 or older, talk with your provider about getting a pneumococcal vaccine every 5 years. You should also get this vaccine if you have chronic asthma or COPD. All adults should get a flu vaccine every fall. Ask your provider about this.   When Talk to your pediatrician regarding the use of this medicine in children. While this drug may be prescribed for children as young as 14 days for selected conditions, precautions do apply. What side effects may I notice from receiving this medicine?   Side If you have the flu, you may be at an increased risk of developing seizures, confusion, or abnormal behavior. This occurs early in the illness, and more frequently in children and teens.  These events are not common, but may result in accidental injury to t

## 2020-02-20 DIAGNOSIS — R05.9 COUGH: Primary | ICD-10-CM

## 2020-02-20 RX ORDER — CODEINE PHOSPHATE AND GUAIFENESIN 10; 100 MG/5ML; MG/5ML
5 SOLUTION ORAL EVERY 6 HOURS PRN
Qty: 240 ML | Refills: 0 | Status: SHIPPED | OUTPATIENT
Start: 2020-02-20 | End: 2020-09-16 | Stop reason: ALTCHOICE

## 2020-02-28 ENCOUNTER — TELEPHONE (OUTPATIENT)
Dept: GASTROENTEROLOGY | Facility: CLINIC | Age: 50
End: 2020-02-28

## 2020-02-28 NOTE — TELEPHONE ENCOUNTER
Dr Brown Listen    I called Gol[facility] to [facility], I spoke to hospitals, pending pharm D.     Please sign off pended prep
Patient has colonoscopy and egd scheduled tomorrow. Patient at [address] now, no prep sent, will try nurse line. Please call at:[phone],thanks.
THANKS JIMMIE !!!!!!!
Metastatic cancer

## 2020-02-29 ENCOUNTER — HOSPITAL ENCOUNTER (OUTPATIENT)
Facility: HOSPITAL | Age: 50
Setting detail: HOSPITAL OUTPATIENT SURGERY
Discharge: HOME | End: 2020-02-29
Attending: INTERNAL MEDICINE | Admitting: INTERNAL MEDICINE
Payer: COMMERCIAL

## 2020-02-29 DIAGNOSIS — R10.13 DYSPEPSIA: ICD-10-CM

## 2020-02-29 DIAGNOSIS — Z12.11 COLON CANCER SCREENING: ICD-10-CM

## 2020-02-29 DIAGNOSIS — K21.9 GASTROESOPHAGEAL REFLUX DISEASE, ESOPHAGITIS PRESENCE NOT SPECIFIED: ICD-10-CM

## 2020-02-29 DIAGNOSIS — R10.32 LLQ ABDOMINAL PAIN: ICD-10-CM

## 2020-02-29 LAB — CLO TEST: POSITIVE

## 2020-02-29 PROCEDURE — 0DB78ZX EXCISION OF STOMACH, PYLORUS, VIA NATURAL OR ARTIFICIAL OPENING ENDOSCOPIC, DIAGNOSTIC: ICD-10-PCS | Performed by: INTERNAL MEDICINE

## 2020-02-29 PROCEDURE — G0500 MOD SEDAT ENDO SERVICE >5YRS: HCPCS | Performed by: INTERNAL MEDICINE

## 2020-02-29 PROCEDURE — 43239 EGD BIOPSY SINGLE/MULTIPLE: CPT | Performed by: INTERNAL MEDICINE

## 2020-02-29 PROCEDURE — 0DJD8ZZ INSPECTION OF LOWER INTESTINAL TRACT, VIA NATURAL OR ARTIFICIAL OPENING ENDOSCOPIC: ICD-10-PCS | Performed by: INTERNAL MEDICINE

## 2020-02-29 PROCEDURE — 45378 DIAGNOSTIC COLONOSCOPY: CPT | Performed by: INTERNAL MEDICINE

## 2020-02-29 RX ORDER — PANTOPRAZOLE SODIUM 40 MG/1
40 TABLET, DELAYED RELEASE ORAL 2 TIMES DAILY
Qty: 28 TABLET | Refills: 0 | Status: SHIPPED | OUTPATIENT
Start: 2020-02-29 | End: 2020-03-14

## 2020-02-29 RX ORDER — MIDAZOLAM HYDROCHLORIDE 1 MG/ML
1 INJECTION INTRAMUSCULAR; INTRAVENOUS EVERY 5 MIN PRN
Status: DISCONTINUED | OUTPATIENT
Start: 2020-02-29 | End: 2020-02-29

## 2020-02-29 RX ORDER — SODIUM CHLORIDE 0.9 % (FLUSH) 0.9 %
10 SYRINGE (ML) INJECTION AS NEEDED
Status: DISCONTINUED | OUTPATIENT
Start: 2020-02-29 | End: 2020-02-29

## 2020-02-29 RX ORDER — CLARITHROMYCIN 500 MG/1
500 TABLET, COATED ORAL 2 TIMES DAILY
Qty: 28 TABLET | Refills: 0 | Status: SHIPPED | OUTPATIENT
Start: 2020-02-29 | End: 2020-03-14

## 2020-02-29 RX ORDER — MIDAZOLAM HYDROCHLORIDE 1 MG/ML
INJECTION INTRAMUSCULAR; INTRAVENOUS
Status: DISCONTINUED | OUTPATIENT
Start: 2020-02-29 | End: 2020-02-29

## 2020-02-29 RX ORDER — AMOXICILLIN 500 MG/1
1000 TABLET, FILM COATED ORAL 2 TIMES DAILY
Qty: 56 TABLET | Refills: 0 | Status: SHIPPED | OUTPATIENT
Start: 2020-02-29 | End: 2020-03-14

## 2020-02-29 RX ORDER — SODIUM CHLORIDE, SODIUM LACTATE, POTASSIUM CHLORIDE, CALCIUM CHLORIDE 600; 310; 30; 20 MG/100ML; MG/100ML; MG/100ML; MG/100ML
INJECTION, SOLUTION INTRAVENOUS CONTINUOUS
Status: DISCONTINUED | OUTPATIENT
Start: 2020-02-29 | End: 2020-02-29

## 2020-02-29 NOTE — OPERATIVE REPORT
EGD AND COLONOSCOPY PROCEDURE REPORTS:    DATE OF PROCEDURE:  2/29/2020    PCP: Alba Carlson MD     PREOPERATIVE DIAGNOSIS:  GERD, dyspepsia, LLQ abd pain, screening CLN     POSTOPERATIVE DIAGNOSIS:  See impression. SURGEON:  Yusuf Toussaint, hepatic flexure. Cecum was confirmed by landmarks, including appendiceal orifice, cecal trifold, ileocecal valve. Retroflexion was performed in the rectum. The quality of the prep was excellent.      COLONOSCOPY FINDINGS:  · Internal hemorrhoids only o

## 2020-02-29 NOTE — H&P
History & Physical Examination    Patient Name: Charbel Francisco  MRN: [id number]  CSN: [id number]  YOB: 1970    Diagnosis: GERD, dyspepsia, LLQ abd pain, screening CLN    Present Illness: 68-year-old woman, healthy minimal medical problems w 2001        Years since quittin.1      Smokeless tobacco: Never Used    Alcohol use:  Yes      Alcohol/week: 0.0 standard drinks      Comment: rare      SYSTEM Check if Review is Normal Check if Physical Exam is Normal If not normal, please explain

## 2020-03-02 VITALS
HEART RATE: 60 BPM | SYSTOLIC BLOOD PRESSURE: 102 MMHG | HEIGHT: 61 IN | WEIGHT: 160 LBS | OXYGEN SATURATION: 98 % | RESPIRATION RATE: 16 BRPM | BODY MASS INDEX: 30.21 KG/M2 | DIASTOLIC BLOOD PRESSURE: 62 MMHG

## 2020-03-19 ENCOUNTER — TELEPHONE (OUTPATIENT)
Dept: GASTROENTEROLOGY | Facility: CLINIC | Age: 50
End: 2020-03-19

## 2020-03-19 NOTE — TELEPHONE ENCOUNTER
Printed and mailed Dr. Ankur Bhat dictated letter to the patient. Recall for 8 week breath test placed via patient outreach. See other TE from 3/19/20 for recall CLN. I called the patient to provide the below instructions.      Left message to call back ALCOHOL WHILE ON TREATMENT d/t adverse side effects. NO breastfeeding while on flagyl. NO cough/cold medications w/ alcohol while on therapy.   2. If MD RX'd-Clarithromycin- hold any STATIN/tamsulosin/escitalopram for duration of treatment as it is contrain

## 2020-03-19 NOTE — TELEPHONE ENCOUNTER
3.  Please recall for H. pylori breath test in 8 weeks, 4.  Recall for colonoscopy exam in 8 years     Recall placed for Colonoscopy in 8 years via patient outreach. Updated health maintenance  See other TE dated 3/19/20 for further documentation.

## 2020-03-19 NOTE — TELEPHONE ENCOUNTER
Message   Received: 5 days ago   [address] [facility],[address], Jael Zelaya MD  P Em Gi Clinical Staff             GI RNs - 1.  Please print and mail this letter to patient; 2.  Please call to follow-up on the H. pylori diagnosis and treatment.  I prescri

## 2020-03-25 ENCOUNTER — TELEPHONE (OUTPATIENT)
Dept: GASTROENTEROLOGY | Facility: CLINIC | Age: 50
End: 2020-03-25

## 2020-03-25 NOTE — TELEPHONE ENCOUNTER
I spoke to the pt and she started the h pylori treatment on 03/01/202    She is now done with the treatment    She had seen Dr Dajuan Balderas message on [address]     Recall placed for the h pylori breath test in 8 weeks on or around 05/10/2020    Colon recall ent

## 2020-05-10 ENCOUNTER — TELEPHONE (OUTPATIENT)
Dept: INTERNAL MEDICINE CLINIC | Facility: CLINIC | Age: 50
End: 2020-05-10

## 2020-05-10 DIAGNOSIS — N30.01 ACUTE CYSTITIS WITH HEMATURIA: Primary | ICD-10-CM

## 2020-05-10 PROCEDURE — 99213 OFFICE O/P EST LOW 20 MIN: CPT | Performed by: PHYSICIAN ASSISTANT

## 2020-05-10 RX ORDER — SULFAMETHOXAZOLE AND TRIMETHOPRIM 800; 160 MG/1; MG/1
1 TABLET ORAL 2 TIMES DAILY
Qty: 6 TABLET | Refills: 0 | Status: SHIPPED | OUTPATIENT
Start: 2020-05-10 | End: 2020-09-16 | Stop reason: ALTCHOICE

## 2020-05-10 NOTE — TELEPHONE ENCOUNTER
Virtual Telephone Check-In    Justice De La Cruz verbally consents to a Virtual/Telephone Check-In visit on 05/10/20. Patient understands and accepts financial responsibility for any deductible, co-insurance and/or co-pays associated with this service.

## 2020-06-01 ENCOUNTER — TELEPHONE (OUTPATIENT)
Dept: GASTROENTEROLOGY | Facility: CLINIC | Age: 50
End: 2020-06-01

## 2020-06-01 DIAGNOSIS — Z86.19 HISTORY OF HELICOBACTER PYLORI INFECTION: Primary | ICD-10-CM

## 2020-06-01 NOTE — TELEPHONE ENCOUNTER
Please see patient outreach message below. Patient is due for repeat H Pylori testing.       From TE from 3/19/2020:  Charlene Kelly RN     12:29 PM   Note      I spoke to the pt and she started the h pylori treatment on 03/01/202     She is now do

## 2020-06-02 NOTE — TELEPHONE ENCOUNTER
I spoke to the pt and she will go to the lab for the repeat h pylori test    Dr Ag Oliva,    Please sign off the pended order

## 2020-06-06 ENCOUNTER — APPOINTMENT (OUTPATIENT)
Dept: LAB | Age: 50
End: 2020-06-06
Attending: INTERNAL MEDICINE
Payer: COMMERCIAL

## 2020-06-23 ENCOUNTER — APPOINTMENT (OUTPATIENT)
Dept: LAB | Facility: HOSPITAL | Age: 50
End: 2020-06-23
Attending: INTERNAL MEDICINE
Payer: COMMERCIAL

## 2020-06-23 DIAGNOSIS — Z86.19 HISTORY OF HELICOBACTER PYLORI INFECTION: ICD-10-CM

## 2020-06-23 PROCEDURE — 83013 H PYLORI (C-13) BREATH: CPT

## 2020-09-16 ENCOUNTER — OFFICE VISIT (OUTPATIENT)
Dept: INTERNAL MEDICINE CLINIC | Facility: CLINIC | Age: 50
End: 2020-09-16
Payer: COMMERCIAL

## 2020-09-16 VITALS
BODY MASS INDEX: 30.5 KG/M2 | WEIGHT: 170 LBS | HEIGHT: 62.5 IN | HEART RATE: 79 BPM | SYSTOLIC BLOOD PRESSURE: 119 MMHG | DIASTOLIC BLOOD PRESSURE: 69 MMHG | TEMPERATURE: 98 F

## 2020-09-16 DIAGNOSIS — Z00.00 ROUTINE GENERAL MEDICAL EXAMINATION AT A HEALTH CARE FACILITY: Primary | ICD-10-CM

## 2020-09-16 DIAGNOSIS — Z12.31 VISIT FOR SCREENING MAMMOGRAM: ICD-10-CM

## 2020-09-16 PROCEDURE — 90686 IIV4 VACC NO PRSV 0.5 ML IM: CPT | Performed by: INTERNAL MEDICINE

## 2020-09-16 PROCEDURE — 99396 PREV VISIT EST AGE 40-64: CPT | Performed by: INTERNAL MEDICINE

## 2020-09-16 PROCEDURE — 3074F SYST BP LT 130 MM HG: CPT | Performed by: INTERNAL MEDICINE

## 2020-09-16 PROCEDURE — 90471 IMMUNIZATION ADMIN: CPT | Performed by: INTERNAL MEDICINE

## 2020-09-16 PROCEDURE — 3008F BODY MASS INDEX DOCD: CPT | Performed by: INTERNAL MEDICINE

## 2020-09-16 PROCEDURE — 3078F DIAST BP <80 MM HG: CPT | Performed by: INTERNAL MEDICINE

## 2020-09-17 ENCOUNTER — APPOINTMENT (OUTPATIENT)
Dept: LAB | Age: 50
End: 2020-09-17
Attending: INTERNAL MEDICINE
Payer: COMMERCIAL

## 2020-09-17 DIAGNOSIS — Z00.00 ROUTINE GENERAL MEDICAL EXAMINATION AT A HEALTH CARE FACILITY: ICD-10-CM

## 2020-09-17 LAB
ALBUMIN SERPL-MCNC: 4 G/DL (ref 3.4–5)
ALBUMIN/GLOB SERPL: 1.3 {RATIO} (ref 1–2)
ALP LIVER SERPL-CCNC: 84 U/L (ref 41–108)
ALT SERPL-CCNC: 64 U/L (ref 13–56)
ANION GAP SERPL CALC-SCNC: 4 MMOL/L (ref 0–18)
AST SERPL-CCNC: 40 U/L (ref 15–37)
BACTERIA UR QL AUTO: NEGATIVE /HPF
BILIRUB SERPL-MCNC: 0.7 MG/DL (ref 0.1–2)
BUN BLD-MCNC: 9 MG/DL (ref 7–18)
BUN/CREAT SERPL: 15 (ref 10–20)
CALCIUM BLD-MCNC: 9.6 MG/DL (ref 8.5–10.1)
CHLORIDE SERPL-SCNC: 105 MMOL/L (ref 98–112)
CHOLEST SMN-MCNC: 169 MG/DL (ref ?–200)
CO2 SERPL-SCNC: 31 MMOL/L (ref 21–32)
CREAT BLD-MCNC: 0.6 MG/DL (ref 0.55–1.02)
DEPRECATED RDW RBC AUTO: 39.9 FL (ref 35.1–46.3)
ERYTHROCYTE [DISTWIDTH] IN BLOOD BY AUTOMATED COUNT: 12.5 % (ref 11–15)
EST. AVERAGE GLUCOSE BLD GHB EST-MCNC: 97 MG/DL (ref 68–126)
GLOBULIN PLAS-MCNC: 3.2 G/DL (ref 2.8–4.4)
GLUCOSE BLD-MCNC: 114 MG/DL (ref 70–99)
HBA1C MFR BLD HPLC: 5 % (ref ?–5.7)
HCT VFR BLD AUTO: 37.4 % (ref 35–48)
HDLC SERPL-MCNC: 33 MG/DL (ref 40–59)
HGB BLD-MCNC: 12.8 G/DL (ref 12–16)
LDLC SERPL CALC-MCNC: 68 MG/DL (ref ?–100)
M PROTEIN MFR SERPL ELPH: 7.2 G/DL (ref 6.4–8.2)
MCH RBC QN AUTO: 30 PG (ref 26–34)
MCHC RBC AUTO-ENTMCNC: 34.2 G/DL (ref 31–37)
MCV RBC AUTO: 87.8 FL (ref 80–100)
NONHDLC SERPL-MCNC: 136 MG/DL (ref ?–130)
OSMOLALITY SERPL CALC.SUM OF ELEC: 290 MOSM/KG (ref 275–295)
PATIENT FASTING Y/N/NP: YES
PATIENT FASTING Y/N/NP: YES
PLATELET # BLD AUTO: 158 10(3)UL (ref 150–450)
POTASSIUM SERPL-SCNC: 4.6 MMOL/L (ref 3.5–5.1)
RBC # BLD AUTO: 4.26 X10(6)UL (ref 3.8–5.3)
RBC #/AREA URNS AUTO: <1 /HPF
SODIUM SERPL-SCNC: 140 MMOL/L (ref 136–145)
T4 FREE SERPL-MCNC: 1 NG/DL (ref 0.8–1.7)
TRIGL SERPL-MCNC: 341 MG/DL (ref 30–149)
TSI SER-ACNC: 2.08 MIU/ML (ref 0.36–3.74)
VLDLC SERPL CALC-MCNC: 68 MG/DL (ref 0–30)
WBC # BLD AUTO: 5.9 X10(3) UL (ref 4–11)
WBC #/AREA URNS AUTO: 1 /HPF

## 2020-09-17 PROCEDURE — 80061 LIPID PANEL: CPT

## 2020-09-17 PROCEDURE — 84439 ASSAY OF FREE THYROXINE: CPT

## 2020-09-17 PROCEDURE — 84443 ASSAY THYROID STIM HORMONE: CPT

## 2020-09-17 PROCEDURE — 85027 COMPLETE CBC AUTOMATED: CPT

## 2020-09-17 PROCEDURE — 80053 COMPREHEN METABOLIC PANEL: CPT

## 2020-09-17 PROCEDURE — 81015 MICROSCOPIC EXAM OF URINE: CPT

## 2020-09-17 PROCEDURE — 36415 COLL VENOUS BLD VENIPUNCTURE: CPT

## 2020-09-17 PROCEDURE — 83036 HEMOGLOBIN GLYCOSYLATED A1C: CPT

## 2020-09-29 PROBLEM — N30.01 ACUTE CYSTITIS WITH HEMATURIA: Status: RESOLVED | Noted: 2020-01-31 | Resolved: 2020-09-29

## 2020-09-29 PROBLEM — Z80.0 FAMILY HISTORY OF MALIGNANT NEOPLASM OF DIGESTIVE ORGAN: Status: RESOLVED | Noted: 2019-09-25 | Resolved: 2020-09-29

## 2020-09-29 PROBLEM — L29.0 RECTAL ITCHING: Status: RESOLVED | Noted: 2020-01-31 | Resolved: 2020-09-29

## 2020-09-29 NOTE — PROGRESS NOTES
HPI:    Patient ID: Tee Blevins is a 44year old female.     HPI    Physical exam  Generally healthy    /69 (BP Location: Right arm, Patient Position: Sitting, Cuff Size: adult)   Pulse 79   Temp 98.1 °F (36.7 °C) (Oral)   Ht 5' 2.5\" (1.588 m) itching on back and legs    HISTORY:  Past Medical History:   Diagnosis Date   • High cholesterol       Past Surgical History:   Procedure Laterality Date   • APPENDECTOMY     • COLONOSCOPY N/A 2/29/2020    Performed by Hernan Flynn MD at [address] [place] [address] Effort normal and breath sounds normal. No respiratory distress. She has no wheezes. She has no rales. She exhibits no tenderness. Abdominal: Soft. Bowel sounds are normal. She exhibits no distension and no mass. There is no hepatosplenomegaly.  There is

## 2020-09-30 ENCOUNTER — HOSPITAL ENCOUNTER (OUTPATIENT)
Dept: MAMMOGRAPHY | Age: 50
Discharge: HOME | End: 2020-09-30
Attending: INTERNAL MEDICINE
Payer: COMMERCIAL

## 2020-09-30 DIAGNOSIS — Z12.31 VISIT FOR SCREENING MAMMOGRAM: ICD-10-CM

## 2020-09-30 PROCEDURE — 77063 BREAST TOMOSYNTHESIS BI: CPT | Performed by: INTERNAL MEDICINE

## 2020-09-30 PROCEDURE — 77067 SCR MAMMO BI INCL CAD: CPT | Performed by: INTERNAL MEDICINE

## 2020-10-10 ENCOUNTER — HOSPITAL ENCOUNTER (OUTPATIENT)
Dept: ULTRASOUND IMAGING | Age: 50
Discharge: HOME | End: 2020-10-10
Attending: INTERNAL MEDICINE
Payer: COMMERCIAL

## 2020-10-10 DIAGNOSIS — R74.8 ELEVATED LIVER ENZYMES: ICD-10-CM

## 2020-10-10 PROCEDURE — 76705 ECHO EXAM OF ABDOMEN: CPT | Performed by: INTERNAL MEDICINE

## 2020-11-02 ENCOUNTER — OFFICE VISIT (OUTPATIENT)
Dept: OBGYN CLINIC | Facility: CLINIC | Age: 50
End: 2020-11-02
Payer: COMMERCIAL

## 2020-11-02 VITALS
WEIGHT: 172 LBS | SYSTOLIC BLOOD PRESSURE: 128 MMHG | BODY MASS INDEX: 30.86 KG/M2 | DIASTOLIC BLOOD PRESSURE: 70 MMHG | HEART RATE: 56 BPM | HEIGHT: 62.5 IN

## 2020-11-02 DIAGNOSIS — Z01.419 ENCOUNTER FOR GYNECOLOGICAL EXAMINATION WITHOUT ABNORMAL FINDING: Primary | ICD-10-CM

## 2020-11-02 PROCEDURE — 3074F SYST BP LT 130 MM HG: CPT | Performed by: OBSTETRICS & GYNECOLOGY

## 2020-11-02 PROCEDURE — 99072 ADDL SUPL MATRL&STAF TM PHE: CPT | Performed by: OBSTETRICS & GYNECOLOGY

## 2020-11-02 PROCEDURE — 3078F DIAST BP <80 MM HG: CPT | Performed by: OBSTETRICS & GYNECOLOGY

## 2020-11-02 PROCEDURE — 3008F BODY MASS INDEX DOCD: CPT | Performed by: OBSTETRICS & GYNECOLOGY

## 2020-11-02 PROCEDURE — 99396 PREV VISIT EST AGE 40-64: CPT | Performed by: OBSTETRICS & GYNECOLOGY

## 2020-11-02 NOTE — PROGRESS NOTES
Moni Crook is a 44year old female [id number] Patient's last menstrual period was 03/15/2016 (approximate). Patient presents with:  Gyn Exam: Annual -- no change in hx / FHx. No  bleed  .     OBSTETRICS HISTORY:  OB History    Para Term  file    Lifestyle      Physical activity        Days per week: Not on file        Minutes per session: Not on file      Stress: Not on file    Relationships      Social connections        Talks on phone: Not on file        Gets together: Not on file MEDICATIONS:    Current Outpatient Medications:   •  hydrocortisone 2.5 % Rectal Cream, Place 1 Application rectally 2 (two) times daily.  (Patient not taking: Reported on 2/19/2020 ), Disp: 1 Tube, Rfl: 2    ALLERGIES:    Radiology Contrast *    HIVE normal in size, contour, position, mobility, without tenderness  Adnexa:   normal without masses or tenderness  Perineum:   normal  Anus: no hemorroids     Assessment & Plan:  Shayy Velazcojared was seen today for gyn exam.    Diagnoses and all orders for this v

## 2021-02-02 ENCOUNTER — NURSE TRIAGE (OUTPATIENT)
Dept: INTERNAL MEDICINE CLINIC | Facility: CLINIC | Age: 51
End: 2021-02-02

## 2021-02-02 NOTE — TELEPHONE ENCOUNTER
Called with the assistance of language line solutions (#).   Action Requested: Summary for Provider     []  Critical Lab, Recommendations Needed  [] Need Additional Advice  []   FYI    []   Need Orders  [] Need Medications Sent to Pharmacy  []  Other     TORRES

## 2021-02-08 ENCOUNTER — OFFICE VISIT (OUTPATIENT)
Dept: INTERNAL MEDICINE CLINIC | Facility: CLINIC | Age: 51
End: 2021-02-08
Payer: COMMERCIAL

## 2021-02-08 VITALS
WEIGHT: 174 LBS | HEIGHT: 62.5 IN | HEART RATE: 69 BPM | BODY MASS INDEX: 31.22 KG/M2 | SYSTOLIC BLOOD PRESSURE: 124 MMHG | DIASTOLIC BLOOD PRESSURE: 74 MMHG

## 2021-02-08 DIAGNOSIS — R73.03 PREDIABETES: ICD-10-CM

## 2021-02-08 DIAGNOSIS — R20.2 PARESTHESIA: ICD-10-CM

## 2021-02-08 DIAGNOSIS — I83.813 VARICOSE VEINS OF BOTH LOWER EXTREMITIES WITH PAIN: Primary | ICD-10-CM

## 2021-02-08 PROCEDURE — 3074F SYST BP LT 130 MM HG: CPT | Performed by: INTERNAL MEDICINE

## 2021-02-08 PROCEDURE — 3008F BODY MASS INDEX DOCD: CPT | Performed by: INTERNAL MEDICINE

## 2021-02-08 PROCEDURE — 3078F DIAST BP <80 MM HG: CPT | Performed by: INTERNAL MEDICINE

## 2021-02-08 PROCEDURE — 99214 OFFICE O/P EST MOD 30 MIN: CPT | Performed by: INTERNAL MEDICINE

## 2021-02-09 NOTE — PROGRESS NOTES
HPI:    Patient ID: Kathie Cervantes is a 44year old female.     HPI    Lower leg paresthesia  Hx of varicose veins s/p stripping left  Superficial varicose veins    Pt standing most of the day at work  Denies swelling or redness    Follow up  Feeling well leg cancer (?) following accident   • Cancer Nephew         stomach ca; son of brother with leg cancer   • Prostate Cancer Half-Brother    • Cancer Half-Sister         unknown primary   • Breast Cancer Neg    • Heart Attack Neg    • Stroke Neg    • Referrals:  US VENOUS REFLUX LOWER EXTREMITY BILATERAL(CPT=93970)        TM#[id number]

## 2021-03-02 ENCOUNTER — LAB ENCOUNTER (OUTPATIENT)
Dept: LAB | Age: 51
End: 2021-03-02
Attending: INTERNAL MEDICINE
Payer: COMMERCIAL

## 2021-03-02 DIAGNOSIS — R20.2 PARESTHESIA: ICD-10-CM

## 2021-03-02 DIAGNOSIS — R73.03 PREDIABETES: ICD-10-CM

## 2021-03-02 LAB
ALT SERPL-CCNC: 60 U/L
ANION GAP SERPL CALC-SCNC: 3 MMOL/L (ref 0–18)
AST SERPL-CCNC: 28 U/L (ref 15–37)
BUN BLD-MCNC: 14 MG/DL (ref 7–18)
BUN/CREAT SERPL: 17.9 (ref 10–20)
CALCIUM BLD-MCNC: 9.4 MG/DL (ref 8.5–10.1)
CHLORIDE SERPL-SCNC: 104 MMOL/L (ref 98–112)
CO2 SERPL-SCNC: 33 MMOL/L (ref 21–32)
CREAT BLD-MCNC: 0.78 MG/DL
GLUCOSE BLD-MCNC: 101 MG/DL (ref 70–99)
OSMOLALITY SERPL CALC.SUM OF ELEC: 291 MOSM/KG (ref 275–295)
PATIENT FASTING Y/N/NP: NO
POTASSIUM SERPL-SCNC: 4 MMOL/L (ref 3.5–5.1)
SODIUM SERPL-SCNC: 140 MMOL/L (ref 136–145)
VIT B12 SERPL-MCNC: 971 PG/ML (ref 193–986)

## 2021-03-02 PROCEDURE — 84450 TRANSFERASE (AST) (SGOT): CPT

## 2021-03-02 PROCEDURE — 82607 VITAMIN B-12: CPT

## 2021-03-02 PROCEDURE — 80048 BASIC METABOLIC PNL TOTAL CA: CPT

## 2021-03-02 PROCEDURE — 84460 ALANINE AMINO (ALT) (SGPT): CPT

## 2021-03-02 PROCEDURE — 36415 COLL VENOUS BLD VENIPUNCTURE: CPT

## 2021-03-04 ENCOUNTER — HOSPITAL ENCOUNTER (OUTPATIENT)
Dept: ULTRASOUND IMAGING | Facility: HOSPITAL | Age: 51
Discharge: HOME | End: 2021-03-04
Attending: INTERNAL MEDICINE
Payer: COMMERCIAL

## 2021-03-04 DIAGNOSIS — I83.813 VARICOSE VEINS OF BOTH LOWER EXTREMITIES WITH PAIN: ICD-10-CM

## 2021-03-04 PROCEDURE — 93970 EXTREMITY STUDY: CPT | Performed by: INTERNAL MEDICINE

## 2021-03-12 DIAGNOSIS — Z23 NEED FOR VACCINATION: ICD-10-CM

## 2021-04-12 ENCOUNTER — NURSE TRIAGE (OUTPATIENT)
Dept: INTERNAL MEDICINE CLINIC | Facility: CLINIC | Age: 51
End: 2021-04-12

## 2021-04-12 DIAGNOSIS — R30.0 DYSURIA: Primary | ICD-10-CM

## 2021-04-12 NOTE — TELEPHONE ENCOUNTER
Action Requested: Summary for Provider     []  Critical Lab, Recommendations Needed  [x] Need Additional Advice  []   FYI    [x]   Need Orders  [] Need Medications Sent to Pharmacy  []  Other     SUMMARY: Dr. Angela Weaver: patient seeking abx.  She declined appt

## 2021-04-13 ENCOUNTER — LAB ENCOUNTER (OUTPATIENT)
Dept: LAB | Age: 51
End: 2021-04-13
Attending: INTERNAL MEDICINE
Payer: COMMERCIAL

## 2021-04-13 ENCOUNTER — TELEPHONE (OUTPATIENT)
Dept: INTERNAL MEDICINE CLINIC | Facility: CLINIC | Age: 51
End: 2021-04-13

## 2021-04-13 DIAGNOSIS — R30.0 DYSURIA: ICD-10-CM

## 2021-04-13 PROCEDURE — 87086 URINE CULTURE/COLONY COUNT: CPT

## 2021-04-13 PROCEDURE — 81001 URINALYSIS AUTO W/SCOPE: CPT

## 2021-04-13 NOTE — TELEPHONE ENCOUNTER
Spoke with patient--symptoms have improved slightly from yesterday--has been drinking extra water and cranberry juice. Patient did have hematuria yesterday--but none today.     Relayed to patient PCP may decide to wait until urine culture and sensitivity is

## 2021-04-13 NOTE — TELEPHONE ENCOUNTER
Patient states she went to do her urine analysis this morning and would like to know if any medication will be prescribed to her. Patient is still having problems when urinating. Patient would like to know if any medication will be prescribed.

## 2021-04-14 NOTE — TELEPHONE ENCOUNTER
Patient has read [employer] message    prescription sent  Message [phone]  From   Jeffry Chiu RN To   Corrin Mcburney and Delivered   4/13/2021  6:09 PM   Last Read in [address]   4/13/2021  6:10 PM by Kathie Cervantes

## 2021-10-15 ENCOUNTER — OFFICE VISIT (OUTPATIENT)
Dept: FAMILY MEDICINE CLINIC | Facility: CLINIC | Age: 51
End: 2021-10-15
Payer: COMMERCIAL

## 2021-10-15 VITALS
DIASTOLIC BLOOD PRESSURE: 80 MMHG | HEIGHT: 62.5 IN | SYSTOLIC BLOOD PRESSURE: 117 MMHG | WEIGHT: 173 LBS | BODY MASS INDEX: 31.04 KG/M2 | HEART RATE: 73 BPM

## 2021-10-15 DIAGNOSIS — R30.0 DYSURIA: ICD-10-CM

## 2021-10-15 DIAGNOSIS — N30.00 ACUTE CYSTITIS WITHOUT HEMATURIA: Primary | ICD-10-CM

## 2021-10-15 PROCEDURE — 81003 URINALYSIS AUTO W/O SCOPE: CPT | Performed by: PHYSICIAN ASSISTANT

## 2021-10-15 PROCEDURE — 3008F BODY MASS INDEX DOCD: CPT | Performed by: PHYSICIAN ASSISTANT

## 2021-10-15 PROCEDURE — 99213 OFFICE O/P EST LOW 20 MIN: CPT | Performed by: PHYSICIAN ASSISTANT

## 2021-10-15 PROCEDURE — 3074F SYST BP LT 130 MM HG: CPT | Performed by: PHYSICIAN ASSISTANT

## 2021-10-15 PROCEDURE — 3079F DIAST BP 80-89 MM HG: CPT | Performed by: PHYSICIAN ASSISTANT

## 2021-10-15 RX ORDER — SULFAMETHOXAZOLE AND TRIMETHOPRIM 800; 160 MG/1; MG/1
1 TABLET ORAL 2 TIMES DAILY
Qty: 14 TABLET | Refills: 0 | Status: SHIPPED | OUTPATIENT
Start: 2021-10-15 | End: 2021-10-22

## 2021-10-15 NOTE — PROGRESS NOTES
HPI:     Dysuria   This is a new problem. The current episode started yesterday. The problem occurs every urination. The problem has been gradually worsening. The quality of the pain is described as burning. There has been no fever.  She is not sexually act Neg    • Heart Attack Neg    • Stroke Neg    • Hypertension Neg    • Ovarian Cancer Neg    • Pancreatic Cancer Neg    • Colon Cancer Neg        Social History:   Social History    Socioeconomic History      Marital status:       Spouse name: Not on Activity:       Days of Exercise per Week: Not on file      Minutes of Exercise per Session: Not on file  Stress:       Feeling of Stress : Not on file  Social Connections:       Frequency of Communication with Friends and Family: Not on file      Frequenc Nose: Nose normal.   Eyes:      General:         Right eye: No discharge. Left eye: No discharge. Conjunctiva/sclera: Conjunctivae normal.   Cardiovascular:      Rate and Rhythm: Normal rate and regular rhythm.       Heart sounds: Normal heart

## 2021-10-19 ENCOUNTER — TELEPHONE (OUTPATIENT)
Dept: FAMILY MEDICINE CLINIC | Facility: CLINIC | Age: 51
End: 2021-10-19

## 2021-10-19 NOTE — TELEPHONE ENCOUNTER
----- Message from Oleg Nicole PA-C sent at 10/19/2021 12:34 PM CDT -----  Genital culture and urine culture are normal.

## 2022-01-26 ENCOUNTER — OFFICE VISIT (OUTPATIENT)
Dept: INTERNAL MEDICINE CLINIC | Facility: CLINIC | Age: 52
End: 2022-01-26
Payer: COMMERCIAL

## 2022-01-26 VITALS
OXYGEN SATURATION: 99 % | WEIGHT: 179 LBS | SYSTOLIC BLOOD PRESSURE: 128 MMHG | DIASTOLIC BLOOD PRESSURE: 74 MMHG | BODY MASS INDEX: 32.12 KG/M2 | HEIGHT: 62.5 IN | HEART RATE: 71 BPM

## 2022-01-26 DIAGNOSIS — R73.03 PREDIABETES: ICD-10-CM

## 2022-01-26 DIAGNOSIS — Z12.31 VISIT FOR SCREENING MAMMOGRAM: ICD-10-CM

## 2022-01-26 DIAGNOSIS — R20.2 PARESTHESIA: ICD-10-CM

## 2022-01-26 DIAGNOSIS — Z00.00 ROUTINE GENERAL MEDICAL EXAMINATION AT A HEALTH CARE FACILITY: Primary | ICD-10-CM

## 2022-01-26 PROCEDURE — 3078F DIAST BP <80 MM HG: CPT | Performed by: INTERNAL MEDICINE

## 2022-01-26 PROCEDURE — 3074F SYST BP LT 130 MM HG: CPT | Performed by: INTERNAL MEDICINE

## 2022-01-26 PROCEDURE — 3008F BODY MASS INDEX DOCD: CPT | Performed by: INTERNAL MEDICINE

## 2022-01-26 PROCEDURE — 90686 IIV4 VACC NO PRSV 0.5 ML IM: CPT | Performed by: INTERNAL MEDICINE

## 2022-01-26 PROCEDURE — 90471 IMMUNIZATION ADMIN: CPT | Performed by: INTERNAL MEDICINE

## 2022-01-26 PROCEDURE — 99396 PREV VISIT EST AGE 40-64: CPT | Performed by: INTERNAL MEDICINE

## 2022-01-29 NOTE — PROGRESS NOTES
HPI:    Patient ID: Lui Barrera is a 46year old female.     HPI    Physical exam    /74 (BP Location: Left arm, Patient Position: Sitting, Cuff Size: adult)   Pulse 71   Ht 5' 2.5\" (1.588 m)   Wt 179 lb (81.2 kg)   LMP 03/15/2016 (Approximate) N/A 2/29/2020    Procedure: COLONOSCOPY;  Surgeon: James Ledesma MD;  Location: [address] [place] [address] ENDOSCOPY      Family History   Problem Relation Age of Onset   • Cancer Father         ?prostate   • Uterine Cancer Mother 80        (cause of death at age 80) wall: No tenderness. Abdominal:      General: There is no distension (obese). Palpations: There is no mass. Tenderness: There is no abdominal tenderness. Musculoskeletal:         General: No tenderness. Cervical back: Neck supple.    Lymp VACCINE QUAD PRESERVATIVE FREE 0.5 ML  NAEEM SCREENING BILAT (CPT=77067)  EKG 12-LEAD        AU#[id number]

## 2022-02-23 ENCOUNTER — HOSPITAL ENCOUNTER (OUTPATIENT)
Dept: MAMMOGRAPHY | Age: 52
Discharge: HOME | End: 2022-02-23
Attending: INTERNAL MEDICINE
Payer: COMMERCIAL

## 2022-02-23 ENCOUNTER — EKG ENCOUNTER (OUTPATIENT)
Dept: LAB | Age: 52
End: 2022-02-23
Attending: INTERNAL MEDICINE
Payer: COMMERCIAL

## 2022-02-23 ENCOUNTER — LAB ENCOUNTER (OUTPATIENT)
Dept: LAB | Age: 52
End: 2022-02-23
Attending: INTERNAL MEDICINE
Payer: COMMERCIAL

## 2022-02-23 DIAGNOSIS — Z00.00 ROUTINE GENERAL MEDICAL EXAMINATION AT A HEALTH CARE FACILITY: ICD-10-CM

## 2022-02-23 DIAGNOSIS — R20.2 PARESTHESIA: ICD-10-CM

## 2022-02-23 DIAGNOSIS — R82.90 ABNORMAL URINALYSIS: ICD-10-CM

## 2022-02-23 DIAGNOSIS — Z12.31 VISIT FOR SCREENING MAMMOGRAM: ICD-10-CM

## 2022-02-23 LAB
ALBUMIN SERPL-MCNC: 4.2 G/DL (ref 3.4–5)
ALBUMIN/GLOB SERPL: 1.4 {RATIO} (ref 1–2)
ALP LIVER SERPL-CCNC: 85 U/L
ALT SERPL-CCNC: 94 U/L
ANION GAP SERPL CALC-SCNC: 4 MMOL/L (ref 0–18)
AST SERPL-CCNC: 56 U/L (ref 15–37)
BILIRUB SERPL-MCNC: 0.6 MG/DL (ref 0.1–2)
BILIRUB UR QL: NEGATIVE
BUN BLD-MCNC: 11 MG/DL (ref 7–18)
BUN/CREAT SERPL: 20 (ref 10–20)
CALCIUM BLD-MCNC: 9 MG/DL (ref 8.5–10.1)
CHLORIDE SERPL-SCNC: 106 MMOL/L (ref 98–112)
CHOLEST SERPL-MCNC: 177 MG/DL (ref ?–200)
CLARITY UR: CLEAR
CO2 SERPL-SCNC: 29 MMOL/L (ref 21–32)
COLOR UR: YELLOW
CREAT BLD-MCNC: 0.55 MG/DL
DEPRECATED RDW RBC AUTO: 42.4 FL (ref 35.1–46.3)
ERYTHROCYTE [DISTWIDTH] IN BLOOD BY AUTOMATED COUNT: 12.8 % (ref 11–15)
EST. AVERAGE GLUCOSE BLD GHB EST-MCNC: 123 MG/DL (ref 68–126)
FASTING PATIENT LIPID ANSWER: YES
FASTING STATUS PATIENT QL REPORTED: YES
FOLATE SERPL-MCNC: >20 NG/ML (ref 8.7–?)
GLOBULIN PLAS-MCNC: 3 G/DL (ref 2.8–4.4)
GLUCOSE BLD-MCNC: 112 MG/DL (ref 70–99)
GLUCOSE UR-MCNC: NEGATIVE MG/DL
HBA1C MFR BLD: 5.9 % (ref ?–5.7)
HCT VFR BLD AUTO: 40.2 %
HDLC SERPL-MCNC: 41 MG/DL (ref 40–59)
HGB BLD-MCNC: 13.5 G/DL
HGB UR QL STRIP.AUTO: NEGATIVE
KETONES UR-MCNC: NEGATIVE MG/DL
LDLC SERPL CALC-MCNC: 104 MG/DL (ref ?–100)
LEUKOCYTE ESTERASE UR QL STRIP.AUTO: NEGATIVE
MCH RBC QN AUTO: 30.3 PG (ref 26–34)
MCHC RBC AUTO-ENTMCNC: 33.6 G/DL (ref 31–37)
MCV RBC AUTO: 90.3 FL
NITRITE UR QL STRIP.AUTO: NEGATIVE
NONHDLC SERPL-MCNC: 136 MG/DL (ref ?–130)
OSMOLALITY SERPL CALC.SUM OF ELEC: 288 MOSM/KG (ref 275–295)
PH UR: 6 [PH] (ref 5–8)
PLATELET # BLD AUTO: 196 10(3)UL (ref 150–450)
POTASSIUM SERPL-SCNC: 4.2 MMOL/L (ref 3.5–5.1)
PROT SERPL-MCNC: 7.2 G/DL (ref 6.4–8.2)
PROT UR-MCNC: NEGATIVE MG/DL
SODIUM SERPL-SCNC: 139 MMOL/L (ref 136–145)
SP GR UR STRIP: 1.01 (ref 1–1.03)
T4 FREE SERPL-MCNC: 1 NG/DL (ref 0.8–1.7)
TRIGL SERPL-MCNC: 184 MG/DL (ref 30–149)
TSI SER-ACNC: 1.83 MIU/ML (ref 0.36–3.74)
UROBILINOGEN UR STRIP-ACNC: <2
VIT B12 SERPL-MCNC: 1219 PG/ML (ref 193–986)
VIT D+METAB SERPL-MCNC: 20.4 NG/ML (ref 30–100)
VLDLC SERPL CALC-MCNC: 31 MG/DL (ref 0–30)
WBC # BLD AUTO: 5 X10(3) UL (ref 4–11)

## 2022-02-23 PROCEDURE — 87086 URINE CULTURE/COLONY COUNT: CPT

## 2022-02-23 PROCEDURE — 84443 ASSAY THYROID STIM HORMONE: CPT

## 2022-02-23 PROCEDURE — 81001 URINALYSIS AUTO W/SCOPE: CPT

## 2022-02-23 PROCEDURE — 77063 BREAST TOMOSYNTHESIS BI: CPT | Performed by: INTERNAL MEDICINE

## 2022-02-23 PROCEDURE — 82746 ASSAY OF FOLIC ACID SERUM: CPT

## 2022-02-23 PROCEDURE — 36415 COLL VENOUS BLD VENIPUNCTURE: CPT

## 2022-02-23 PROCEDURE — 83036 HEMOGLOBIN GLYCOSYLATED A1C: CPT

## 2022-02-23 PROCEDURE — 93010 ELECTROCARDIOGRAM REPORT: CPT | Performed by: INTERNAL MEDICINE

## 2022-02-23 PROCEDURE — 81015 MICROSCOPIC EXAM OF URINE: CPT

## 2022-02-23 PROCEDURE — 82306 VITAMIN D 25 HYDROXY: CPT

## 2022-02-23 PROCEDURE — 80053 COMPREHEN METABOLIC PANEL: CPT

## 2022-02-23 PROCEDURE — 77067 SCR MAMMO BI INCL CAD: CPT | Performed by: INTERNAL MEDICINE

## 2022-02-23 PROCEDURE — 84439 ASSAY OF FREE THYROXINE: CPT

## 2022-02-23 PROCEDURE — 93005 ELECTROCARDIOGRAM TRACING: CPT

## 2022-02-23 PROCEDURE — 82607 VITAMIN B-12: CPT

## 2022-02-23 PROCEDURE — 80061 LIPID PANEL: CPT

## 2022-02-23 PROCEDURE — 85027 COMPLETE CBC AUTOMATED: CPT

## 2022-08-15 LAB — AMB EXT COVID-19 RESULT: DETECTED

## 2022-08-19 ENCOUNTER — LAB ENCOUNTER (OUTPATIENT)
Dept: LAB | Age: 52
End: 2022-08-19
Attending: INTERNAL MEDICINE
Payer: COMMERCIAL

## 2022-08-19 ENCOUNTER — NURSE TRIAGE (OUTPATIENT)
Dept: INTERNAL MEDICINE CLINIC | Facility: CLINIC | Age: 52
End: 2022-08-19

## 2022-08-19 DIAGNOSIS — R05.1 ACUTE COUGH: ICD-10-CM

## 2022-08-19 DIAGNOSIS — R05.1 ACUTE COUGH: Primary | ICD-10-CM

## 2022-08-19 DIAGNOSIS — U07.1 COVID-19: Primary | ICD-10-CM

## 2022-08-19 NOTE — TELEPHONE ENCOUNTER
Pt contacted. Verified name and . Pt states she needed the COVID test to confirm she is no longer positive so that she can return to work. Pt states symptoms started on Monday 8/15. Pt had covid test done today. Pt informed it may have been too soon for the test and could still show positive results. Pt informed Dr. David Gaming ordered another PCR test if needed for work. Pt verbalized understanding. Pt aware results will be released to [employer].

## 2022-08-20 LAB — SARS-COV-2 RNA RESP QL NAA+PROBE: DETECTED

## 2022-08-24 ENCOUNTER — LAB ENCOUNTER (OUTPATIENT)
Dept: LAB | Age: 52
End: 2022-08-24
Attending: INTERNAL MEDICINE
Payer: COMMERCIAL

## 2022-08-24 DIAGNOSIS — U07.1 COVID-19: ICD-10-CM

## 2022-08-25 LAB — SARS-COV-2 RNA RESP QL NAA+PROBE: DETECTED

## 2022-08-29 NOTE — TELEPHONE ENCOUNTER
Please advise if we can sent a letter. The patient was never seen. She stated her work is requesting a covid test to return to work. Instructed we do not do repeat covid testing. She is asking for a letter. The patient stated denies any symptoms besides an occasional cough,  Denies a fever or use of fever reducing medication for 24 hours. Denies ever having diarrhea/vomiting.   Tested covid positive on 8/24/22

## 2022-08-30 NOTE — TELEPHONE ENCOUNTER
Spoke with the patient,verified full name and       Stated she tested Covid (-)  Today so no need for appointment

## 2022-09-15 ENCOUNTER — APPOINTMENT (OUTPATIENT)
Dept: OCCUPATIONAL MEDICINE | Age: 52
End: 2022-09-15
Attending: FAMILY MEDICINE

## 2023-04-14 ENCOUNTER — HOSPITAL ENCOUNTER (OUTPATIENT)
Dept: GENERAL RADIOLOGY | Facility: HOSPITAL | Age: 53
Discharge: HOME | End: 2023-04-14
Attending: INTERNAL MEDICINE
Payer: COMMERCIAL

## 2023-04-14 ENCOUNTER — NURSE TRIAGE (OUTPATIENT)
Dept: INTERNAL MEDICINE CLINIC | Facility: CLINIC | Age: 53
End: 2023-04-14

## 2023-04-14 ENCOUNTER — OFFICE VISIT (OUTPATIENT)
Dept: INTERNAL MEDICINE CLINIC | Facility: CLINIC | Age: 53
End: 2023-04-14

## 2023-04-14 VITALS
OXYGEN SATURATION: 96 % | HEIGHT: 62.5 IN | SYSTOLIC BLOOD PRESSURE: 116 MMHG | WEIGHT: 171 LBS | HEART RATE: 74 BPM | DIASTOLIC BLOOD PRESSURE: 72 MMHG | BODY MASS INDEX: 30.68 KG/M2
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
## 2023-04-14 DIAGNOSIS — Z12.31 ENCOUNTER FOR SCREENING MAMMOGRAM FOR MALIGNANT NEOPLASM OF BREAST: ICD-10-CM

## 2023-04-14 DIAGNOSIS — R07.9 RIGHT-SIDED CHEST PAIN: ICD-10-CM

## 2023-04-14 DIAGNOSIS — R07.9 RIGHT-SIDED CHEST PAIN: Primary | ICD-10-CM

## 2023-04-14 PROCEDURE — 3074F SYST BP LT 130 MM HG: CPT | Performed by: INTERNAL MEDICINE

## 2023-04-14 PROCEDURE — 3008F BODY MASS INDEX DOCD: CPT | Performed by: INTERNAL MEDICINE

## 2023-04-14 PROCEDURE — 71100 X-RAY EXAM RIBS UNI 2 VIEWS: CPT | Performed by: INTERNAL MEDICINE

## 2023-04-14 PROCEDURE — 99214 OFFICE O/P EST MOD 30 MIN: CPT | Performed by: INTERNAL MEDICINE

## 2023-04-14 PROCEDURE — 3078F DIAST BP <80 MM HG: CPT | Performed by: INTERNAL MEDICINE

## 2023-04-14 RX ORDER — MELOXICAM 15 MG/1
15 TABLET ORAL DAILY
Qty: 14 TABLET | Refills: 0 | Status: SHIPPED | OUTPATIENT
Start: 2023-04-14

## 2023-06-27 ENCOUNTER — HOSPITAL ENCOUNTER (OUTPATIENT)
Age: 53
Discharge: ANOTHER HOSPITAL | End: 2023-06-27
Payer: COMMERCIAL

## 2023-06-27 ENCOUNTER — APPOINTMENT (OUTPATIENT)
Dept: GENERAL RADIOLOGY | Age: 53
End: 2023-06-27
Attending: NURSE PRACTITIONER
Payer: COMMERCIAL

## 2023-06-27 ENCOUNTER — NURSE TRIAGE (OUTPATIENT)
Dept: INTERNAL MEDICINE CLINIC | Facility: CLINIC | Age: 53
End: 2023-06-27

## 2023-06-27 ENCOUNTER — HOSPITAL ENCOUNTER (EMERGENCY)
Facility: HOSPITAL | Age: 53
Discharge: HOME | End: 2023-06-27
Attending: EMERGENCY MEDICINE
Payer: COMMERCIAL

## 2023-06-27 VITALS
RESPIRATION RATE: 18 BRPM | SYSTOLIC BLOOD PRESSURE: 149 MMHG | WEIGHT: 167 LBS | OXYGEN SATURATION: 99 % | DIASTOLIC BLOOD PRESSURE: 80 MMHG | HEIGHT: 62 IN | HEART RATE: 69 BPM | BODY MASS INDEX: 30.73 KG/M2 | TEMPERATURE: 98 F

## 2023-06-27 VITALS
RESPIRATION RATE: 22 BRPM | SYSTOLIC BLOOD PRESSURE: 122 MMHG | HEART RATE: 77 BPM | TEMPERATURE: 98 F | DIASTOLIC BLOOD PRESSURE: 62 MMHG | OXYGEN SATURATION: 98 %

## 2023-06-27 DIAGNOSIS — R07.9 CHEST PAIN OF UNCERTAIN ETIOLOGY: Primary | ICD-10-CM

## 2023-06-27 DIAGNOSIS — R07.9 RIGHT-SIDED CHEST PAIN: Primary | ICD-10-CM

## 2023-06-27 DIAGNOSIS — R79.89 ELEVATED D-DIMER: ICD-10-CM

## 2023-06-27 LAB
#MXD IC: 0.9 X10ˆ3/UL (ref 0.1–1)
ANION GAP SERPL CALC-SCNC: 5 MMOL/L (ref 0–18)
ATRIAL RATE: 70 BPM
BASOPHILS # BLD AUTO: 0.04 X10(3) UL (ref 0–0.2)
BASOPHILS NFR BLD AUTO: 0.6 %
BUN BLD-MCNC: 10 MG/DL (ref 7–18)
BUN BLD-MCNC: 9 MG/DL (ref 7–18)
BUN/CREAT SERPL: 14.9 (ref 10–20)
CALCIUM BLD-MCNC: 8.9 MG/DL (ref 8.5–10.1)
CHLORIDE BLD-SCNC: 104 MMOL/L (ref 98–112)
CHLORIDE SERPL-SCNC: 109 MMOL/L (ref 98–112)
CO2 BLD-SCNC: 24 MMOL/L (ref 21–32)
CO2 SERPL-SCNC: 26 MMOL/L (ref 21–32)
CREAT BLD-MCNC: 0.67 MG/DL
CREAT BLD-MCNC: 0.7 MG/DL
DDIMER WHOLE BLOOD: 403 NG/ML DDU (ref ?–400)
DEPRECATED RDW RBC AUTO: 45.6 FL (ref 35.1–46.3)
EOSINOPHIL # BLD AUTO: 0.14 X10(3) UL (ref 0–0.7)
EOSINOPHIL NFR BLD AUTO: 2.1 %
ERYTHROCYTE [DISTWIDTH] IN BLOOD BY AUTOMATED COUNT: 14.5 % (ref 11–15)
GFR SERPLBLD BASED ON 1.73 SQ M-ARVRAT: 102 ML/MIN/1.73M2 (ref 60–?)
GFR SERPLBLD BASED ON 1.73 SQ M-ARVRAT: 103 ML/MIN/1.73M2 (ref 60–?)
GLUCOSE BLD-MCNC: 108 MG/DL (ref 70–99)
GLUCOSE BLD-MCNC: 108 MG/DL (ref 70–99)
HCT VFR BLD AUTO: 34.8 %
HCT VFR BLD AUTO: 35.7 %
HCT VFR BLD CALC: 38 %
HGB BLD-MCNC: 12.1 G/DL
HGB BLD-MCNC: 12.5 G/DL
IMM GRANULOCYTES # BLD AUTO: 0.02 X10(3) UL (ref 0–1)
IMM GRANULOCYTES NFR BLD: 0.3 %
ISTAT IONIZED CALCIUM FOR CHEM 8: 1.18 MMOL/L (ref 1.12–1.32)
LYMPHOCYTES # BLD AUTO: 1.73 X10(3) UL (ref 1–4)
LYMPHOCYTES # BLD AUTO: 1.8 X10ˆ3/UL (ref 1–4)
LYMPHOCYTES NFR BLD AUTO: 25.7 %
LYMPHOCYTES NFR BLD AUTO: 25.9 %
MCH RBC QN AUTO: 30.1 PG (ref 26–34)
MCH RBC QN AUTO: 30.7 PG (ref 26–34)
MCHC RBC AUTO-ENTMCNC: 34.8 G/DL (ref 31–37)
MCHC RBC AUTO-ENTMCNC: 35 G/DL (ref 31–37)
MCV RBC AUTO: 86.6 FL (ref 80–100)
MCV RBC AUTO: 87.7 FL
MIXED CELL %: 12.7 %
MONOCYTES # BLD AUTO: 0.6 X10(3) UL (ref 0.1–1)
MONOCYTES NFR BLD AUTO: 8.9 %
NEUTROPHILS # BLD AUTO: 4.19 X10 (3) UL (ref 1.5–7.7)
NEUTROPHILS # BLD AUTO: 4.19 X10(3) UL (ref 1.5–7.7)
NEUTROPHILS # BLD AUTO: 4.3 X10ˆ3/UL (ref 1.5–7.7)
NEUTROPHILS NFR BLD AUTO: 61.4 %
NEUTROPHILS NFR BLD AUTO: 62.4 %
OSMOLALITY SERPL CALC.SUM OF ELEC: 290 MOSM/KG (ref 275–295)
P AXIS: 23 DEGREES
P-R INTERVAL: 164 MS
PLATELET # BLD AUTO: 171 10(3)UL (ref 150–450)
PLATELET # BLD AUTO: 172 X10ˆ3/UL (ref 150–450)
POTASSIUM BLD-SCNC: 3.8 MMOL/L (ref 3.6–5.1)
POTASSIUM SERPL-SCNC: 3.9 MMOL/L (ref 3.5–5.1)
Q-T INTERVAL: 382 MS
QRS DURATION: 88 MS
QTC CALCULATION (BEZET): 412 MS
R AXIS: -20 DEGREES
RBC # BLD AUTO: 4.02 X10ˆ6/UL
RBC # BLD AUTO: 4.07 X10(6)UL
SODIUM BLD-SCNC: 141 MMOL/L (ref 136–145)
SODIUM SERPL-SCNC: 140 MMOL/L (ref 136–145)
T AXIS: 26 DEGREES
TROPONIN I BLD-MCNC: <0.02 NG/ML
TROPONIN I HIGH SENSITIVITY: 4 NG/L
VENTRICULAR RATE: 70 BPM
WBC # BLD AUTO: 6.7 X10(3) UL (ref 4–11)
WBC # BLD AUTO: 7 X10ˆ3/UL (ref 4–11)

## 2023-06-27 PROCEDURE — 99284 EMERGENCY DEPT VISIT MOD MDM: CPT

## 2023-06-27 PROCEDURE — 99214 OFFICE O/P EST MOD 30 MIN: CPT | Performed by: NURSE PRACTITIONER

## 2023-06-27 PROCEDURE — 85025 COMPLETE CBC W/AUTO DIFF WBC: CPT | Performed by: NURSE PRACTITIONER

## 2023-06-27 PROCEDURE — 71046 X-RAY EXAM CHEST 2 VIEWS: CPT | Performed by: NURSE PRACTITIONER

## 2023-06-27 PROCEDURE — 80047 BASIC METABLC PNL IONIZED CA: CPT | Performed by: NURSE PRACTITIONER

## 2023-06-27 PROCEDURE — 80048 BASIC METABOLIC PNL TOTAL CA: CPT | Performed by: EMERGENCY MEDICINE

## 2023-06-27 PROCEDURE — 36415 COLL VENOUS BLD VENIPUNCTURE: CPT

## 2023-06-27 PROCEDURE — 93000 ELECTROCARDIOGRAM COMPLETE: CPT | Performed by: NURSE PRACTITIONER

## 2023-06-27 PROCEDURE — 36415 COLL VENOUS BLD VENIPUNCTURE: CPT | Performed by: NURSE PRACTITIONER

## 2023-06-27 PROCEDURE — 99283 EMERGENCY DEPT VISIT LOW MDM: CPT

## 2023-06-27 PROCEDURE — 84484 ASSAY OF TROPONIN QUANT: CPT | Performed by: EMERGENCY MEDICINE

## 2023-06-27 PROCEDURE — 84484 ASSAY OF TROPONIN QUANT: CPT | Performed by: NURSE PRACTITIONER

## 2023-06-27 NOTE — ED INITIAL ASSESSMENT (HPI)
S: pt presents to ed with c/o sudden onset of sob this am. Seen at [facility] and sent for elevated dimer. Pt has an allergy to contrast dye, hives. Pt states she feels better at this time.     B: na    A: well appearing    R: defer to md.

## 2023-06-27 NOTE — TELEPHONE ENCOUNTER
Please reply to pool: EM RN TRIAGE    Action Requested: Summary for Provider     []  Critical Lab, Recommendations Needed  [] Need Additional Advice  []   FYI    []   Need Orders  [] Need Medications Sent to Pharmacy  []  Other     SUMMARY: Go to ED now per protocol, patient agrees . Reason for call: Chest Pain  Onset: Data Unavailable    Reported shortness of breath happened early morning 4 am,could not breath, less than a minute, feel like she's going to die,  right chest pain 4-5/10, patient keep clearing  her throat,no bleeding,no injury, CONTINUOUS CHEST pain even when she is just talking.            Reason for Disposition   SEVERE chest pain    Protocols used: Chest Pain-A-OH

## 2023-06-28 NOTE — TELEPHONE ENCOUNTER
She understands to follow-up closely within the next few days with her primary care physician. Continues to be asymptomatic throughout the emergency department visit.   She is comfortable with this discharge plan  Disposition and Plan      Clinical Impression:  Chest pain of uncertain etiology  (primary encounter diagnosis)     Disposition:  Discharge     Follow-up:  Lilly Tse, [id number] [id number]  [phone]     Schedule an appointment as soon as possible for a visit in 1 day(s)           Medications Prescribed:  Current Discharge Medication List

## 2023-06-29 NOTE — TELEPHONE ENCOUNTER
Dr Michael=see note below, please advice for ED FU schedule,thanks. No future appointments. Janes Gutierrez   to Em Im Alexsandra Lpn/Cma  Em Rn Triage   MD    6/28/23 12:51 PM  FYI: Talked to patient offered her an appointment with Sabrina HYMAN of Dr Roslyn Lanes due to doctor is fully booked and the earliest appointment of Dr Roslyn Lanes is not until 07/10/2023 \"res24\" patient refused, she would like only Dr Roslyn Lanes. Please advise Thank you.     Please reply to pool: EM CC IM FM ALG RHE

## 2023-07-07 NOTE — TELEPHONE ENCOUNTER
Patient viewed [employer] message:  From  Tory Jara RN To  Maurizio Pizano and Delivered  7/6/2023  4:48 PM   Last Read in [address]  7/6/2023  5:14 PM by Michoacano Henry

## 2023-07-13 ENCOUNTER — OFFICE VISIT (OUTPATIENT)
Dept: INTERNAL MEDICINE CLINIC | Facility: CLINIC | Age: 53
End: 2023-07-13

## 2023-07-13 VITALS
SYSTOLIC BLOOD PRESSURE: 139 MMHG | DIASTOLIC BLOOD PRESSURE: 70 MMHG | WEIGHT: 167 LBS | BODY MASS INDEX: 30.73 KG/M2 | RESPIRATION RATE: 14 BRPM | OXYGEN SATURATION: 97 % | HEIGHT: 62 IN | HEART RATE: 76 BPM

## 2023-07-13 DIAGNOSIS — R09.82 POSTNASAL DRIP: ICD-10-CM

## 2023-07-13 DIAGNOSIS — R06.02 SOB (SHORTNESS OF BREATH): Primary | ICD-10-CM

## 2023-07-13 PROCEDURE — 3075F SYST BP GE 130 - 139MM HG: CPT | Performed by: INTERNAL MEDICINE

## 2023-07-13 PROCEDURE — 99214 OFFICE O/P EST MOD 30 MIN: CPT | Performed by: INTERNAL MEDICINE

## 2023-07-13 PROCEDURE — 3008F BODY MASS INDEX DOCD: CPT | Performed by: INTERNAL MEDICINE

## 2023-07-13 PROCEDURE — 3078F DIAST BP <80 MM HG: CPT | Performed by: INTERNAL MEDICINE

## 2023-07-13 RX ORDER — FLUTICASONE PROPIONATE 50 MCG
2 SPRAY, SUSPENSION (ML) NASAL DAILY
Qty: 15.8 ML | Refills: 0 | Status: SHIPPED | OUTPATIENT
Start: 2023-07-13 | End: 2024-07-07

## 2023-07-27 ENCOUNTER — HOSPITAL ENCOUNTER (OUTPATIENT)
Dept: RESPIRATORY THERAPY | Facility: HOSPITAL | Age: 53
Discharge: HOME | End: 2023-07-27
Attending: INTERNAL MEDICINE
Payer: COMMERCIAL

## 2023-07-27 DIAGNOSIS — R06.02 SOB (SHORTNESS OF BREATH): ICD-10-CM

## 2023-07-27 PROCEDURE — 94726 PLETHYSMOGRAPHY LUNG VOLUMES: CPT | Performed by: INTERNAL MEDICINE

## 2023-07-27 PROCEDURE — 94729 DIFFUSING CAPACITY: CPT | Performed by: INTERNAL MEDICINE

## 2023-07-27 PROCEDURE — 94060 EVALUATION OF WHEEZING: CPT | Performed by: INTERNAL MEDICINE

## 2023-07-27 NOTE — PROCEDURES
[facility]     Pulmonary Function Test     Jazmín Rojas Patient Status:  Outpatient    3/12/1970 MRN [id number]   Date of Exam 23 PCP Allyssa Zapata MD           Spirometry   FEV1: 2.18 89%  FVC: 2.63 86%  FEV1/FVC: 0.83    Lung Volume   TLC: 3.54 75%  RV : 0.76 43%    Diffusion Capacity   DLCO: 19.5 91%    Flow Volume Loop       Impression   No significant obstructive defect seen without significant postbronchodilator response observed. Mildly decreased lung volumes although no significant restriction seen. Normal diffusion pasty.     Jack Yee DO  Pulmonary [facility]

## 2023-08-18 ENCOUNTER — HOSPITAL ENCOUNTER (OUTPATIENT)
Dept: MAMMOGRAPHY | Facility: HOSPITAL | Age: 53
Discharge: HOME | End: 2023-08-18
Attending: INTERNAL MEDICINE
Payer: COMMERCIAL

## 2023-08-18 DIAGNOSIS — Z12.31 ENCOUNTER FOR SCREENING MAMMOGRAM FOR MALIGNANT NEOPLASM OF BREAST: ICD-10-CM

## 2023-08-18 PROCEDURE — 77063 BREAST TOMOSYNTHESIS BI: CPT | Performed by: INTERNAL MEDICINE

## 2023-08-18 PROCEDURE — 77067 SCR MAMMO BI INCL CAD: CPT | Performed by: INTERNAL MEDICINE

## 2023-12-04 ENCOUNTER — OFFICE VISIT (OUTPATIENT)
Dept: INTERNAL MEDICINE CLINIC | Facility: CLINIC | Age: 53
End: 2023-12-04

## 2023-12-04 VITALS
BODY MASS INDEX: 32.57 KG/M2 | HEIGHT: 62 IN | SYSTOLIC BLOOD PRESSURE: 127 MMHG | HEART RATE: 76 BPM | WEIGHT: 177 LBS | DIASTOLIC BLOOD PRESSURE: 79 MMHG

## 2023-12-04 DIAGNOSIS — Z12.4 SCREENING FOR CERVICAL CANCER: ICD-10-CM

## 2023-12-04 DIAGNOSIS — Z00.00 PHYSICAL EXAM: Primary | ICD-10-CM

## 2023-12-04 DIAGNOSIS — Z12.31 VISIT FOR SCREENING MAMMOGRAM: ICD-10-CM

## 2023-12-04 PROCEDURE — 3074F SYST BP LT 130 MM HG: CPT | Performed by: INTERNAL MEDICINE

## 2023-12-04 PROCEDURE — 99396 PREV VISIT EST AGE 40-64: CPT | Performed by: INTERNAL MEDICINE

## 2023-12-04 PROCEDURE — 3008F BODY MASS INDEX DOCD: CPT | Performed by: INTERNAL MEDICINE

## 2023-12-04 PROCEDURE — 3078F DIAST BP <80 MM HG: CPT | Performed by: INTERNAL MEDICINE

## 2024-03-28 ENCOUNTER — NURSE TRIAGE (OUTPATIENT)
Dept: INTERNAL MEDICINE CLINIC | Facility: CLINIC | Age: 54
End: 2024-03-28

## 2024-03-28 ENCOUNTER — VIRTUAL PHONE E/M (OUTPATIENT)
Dept: INTERNAL MEDICINE CLINIC | Facility: CLINIC | Age: 54
End: 2024-03-28
Payer: COMMERCIAL

## 2024-03-28 ENCOUNTER — LAB ENCOUNTER (OUTPATIENT)
Dept: LAB | Age: 54
End: 2024-03-28
Attending: STUDENT IN AN ORGANIZED HEALTH CARE EDUCATION/TRAINING PROGRAM
Payer: COMMERCIAL

## 2024-03-28 DIAGNOSIS — R30.0 DYSURIA: ICD-10-CM

## 2024-03-28 DIAGNOSIS — R30.0 DYSURIA: Primary | ICD-10-CM

## 2024-03-28 LAB
BILIRUB UR QL: NEGATIVE
COLOR UR: COLORLESS
GLUCOSE UR-MCNC: NORMAL MG/DL
KETONES UR-MCNC: NEGATIVE MG/DL
LEUKOCYTE ESTERASE UR QL STRIP.AUTO: 500
NITRITE UR QL STRIP.AUTO: NEGATIVE
PH UR: 5.5 [PH] (ref 5–8)
PROT UR-MCNC: 30 MG/DL
RBC #/AREA URNS AUTO: >10 /HPF
SP GR UR STRIP: 1.01 (ref 1–1.03)
UROBILINOGEN UR STRIP-ACNC: NORMAL
WBC #/AREA URNS AUTO: >50 /HPF

## 2024-03-28 PROCEDURE — 87186 SC STD MICRODIL/AGAR DIL: CPT

## 2024-03-28 PROCEDURE — 81001 URINALYSIS AUTO W/SCOPE: CPT

## 2024-03-28 PROCEDURE — 87086 URINE CULTURE/COLONY COUNT: CPT

## 2024-03-28 PROCEDURE — 99213 OFFICE O/P EST LOW 20 MIN: CPT | Performed by: STUDENT IN AN ORGANIZED HEALTH CARE EDUCATION/TRAINING PROGRAM

## 2024-03-28 PROCEDURE — 87088 URINE BACTERIA CULTURE: CPT

## 2024-03-28 RX ORDER — SULFAMETHOXAZOLE AND TRIMETHOPRIM 800; 160 MG/1; MG/1
1 TABLET ORAL 2 TIMES DAILY
Qty: 14 TABLET | Refills: 0 | Status: SHIPPED | OUTPATIENT
Start: 2024-03-28 | End: 2024-04-04

## 2024-03-28 RX ORDER — PHENAZOPYRIDINE HYDROCHLORIDE 200 MG/1
200 TABLET, FILM COATED ORAL 3 TIMES DAILY PRN
Qty: 9 TABLET | Refills: 0 | Status: SHIPPED | OUTPATIENT
Start: 2024-03-28 | End: 2024-03-31

## 2024-03-28 NOTE — PROGRESS NOTES
Virtual Telephone Check-In    Razia Sanford verbally consents to a Virtual Telephone Check-In service on 24. Patient understands and accepts financial responsibility for any deductible, co-insurance and/or co-pays associated with this service.     I spoke with Razia Sanford by telephone, verified date of birth, and discussed their current concerns:     Pt is a 54y.o female calling with regards of hemturia and dysuria since yesterday.       Review of systems.  Review of Systems   Genitourinary:  Positive for dysuria, frequency and hematuria. Negative for flank pain.        Reviewed Active Problems:  Patient Active Problem List    Diagnosis    SOB (shortness of breath)    Family history of gastric cancer      Reviewed Past Medical History:  Past Medical History:   Diagnosis Date    High cholesterol       Reviewed Allergies  Allergies   Allergen Reactions    Radiology Contrast Iodinated Dyes HIVES     And itching on back and legs      Reviewed Social History:  Social History     Socioeconomic History    Marital status:    Tobacco Use    Smoking status: Former     Years: 5     Types: Cigarettes     Quit date: 2001     Years since quittin.2     Passive exposure: Past    Smokeless tobacco: Never   Vaping Use    Vaping Use: Never used   Substance and Sexual Activity    Alcohol use: Yes     Alcohol/week: 0.0 standard drinks of alcohol     Comment: rare    Drug use: No   Other Topics Concern    Caffeine Concern Yes     Comment: Tea, 2 cups daily.    Reaction to local anesthetic No      Reviewed Current Medications:  No current outpatient medications on file.          Physical Exam  There were no vitals filed for this visit.  Physical Exam  Neurological:      Mental Status: She is alert.      Limited due to telephone encounter      Diagnosis:  There are no diagnoses linked to this encounter.  Pt complaining of Dysuria with UA ordered with reflex to , and clinically patient complaining of  symptomatic cystitis/dysuria meets criteria for empiric treatment. No CVA tenderness,   Plan:  h UA ordered with reflex to culture  -We will start Bactrim DS BID for 7 days Cystitis, acute uncomplicated or acute simple cystitis. (infection limited to the bladder without signs/symptoms of upper tract, prostate, or systemic infection)  -Encourage patient to take increased fluids, cranberry juice and urine should be light yellow-clear.  -Call back/return to clinic within 3-4 days if symptoms do not improve.      Follow up: No follow-ups on file.  Duration of service, in minutes: 20 min  Please note that the following visit was completed using telephone communications.  This has been done in good reyes to provide continuity of care in the best interest of the provider-patient relationship, due to the ongoing public health crisis/national emergency and because of restrictions of visitation.  There are limitations of this visit as no physical exam could be performed.  Every conscious effort was taken to allow for sufficient and adequate time.  This billing was spent on reviewing labs, medications, radiology tests and decision making.  Appropriate medical decision-making and tests are ordered as detailed in the plan of care above.  Patient also advised to follow CDC guidelines for self isolation/social distancing and symptomatic treatment as outlined on CDC Patient Guidelines.  Kevin Miller MD

## 2024-03-28 NOTE — TELEPHONE ENCOUNTER
Action Requested: Summary for Provider     []  Critical Lab, Recommendations Needed  [] Need Additional Advice  []   FYI    []   Need Orders  [] Need Medications Sent to Pharmacy  []  Other     SUMMARY: Per protocol disposition advised  See Today in Office. .Appointment scheduled:  Future Appointments   Date Time Provider Department Center   3/28/2024  3:00 PM Kevin Miller MD [facility]   4/11/2024 10:20 AM Johanne Lee MD [facility]     Reason for call: Urinary Symptoms  Onset: 6 hours ago    Blood in urine; burning with urination  Denies: fever, flank pain, and protocol questions marked with \"no\"    Reason for Disposition   Pain or burning with passing urine (urination)    Protocols used: Urine - Blood In-A-OH

## 2024-03-30 NOTE — PROGRESS NOTES
No action needed:  Carli Randle, your urine culture shows you have a E.Coli  Urinary tract infection. I have already started you on the appropriate antibiotic that should treat your infection. Please continue to complete the full 7 day course.

## 2024-05-23 ENCOUNTER — OFFICE VISIT (OUTPATIENT)
Dept: OBGYN CLINIC | Facility: CLINIC | Age: 54
End: 2024-05-23

## 2024-05-23 VITALS
DIASTOLIC BLOOD PRESSURE: 65 MMHG | SYSTOLIC BLOOD PRESSURE: 100 MMHG | HEIGHT: 62 IN | HEART RATE: 76 BPM | WEIGHT: 186.38 LBS | BODY MASS INDEX: 34.3 KG/M2

## 2024-05-23 DIAGNOSIS — Z12.4 SCREENING FOR CERVICAL CANCER: ICD-10-CM

## 2024-05-23 DIAGNOSIS — Z12.31 ENCOUNTER FOR SCREENING MAMMOGRAM FOR MALIGNANT NEOPLASM OF BREAST: ICD-10-CM

## 2024-05-23 DIAGNOSIS — O99.210 OBESITY AFFECTING PREGNANCY, ANTEPARTUM, UNSPECIFIED OBESITY TYPE (HCC): ICD-10-CM

## 2024-05-23 DIAGNOSIS — Z01.419 WELL WOMAN EXAM WITH ROUTINE GYNECOLOGICAL EXAM: Primary | ICD-10-CM

## 2024-05-23 PROCEDURE — 3008F BODY MASS INDEX DOCD: CPT | Performed by: NURSE PRACTITIONER

## 2024-05-23 PROCEDURE — 99396 PREV VISIT EST AGE 40-64: CPT | Performed by: NURSE PRACTITIONER

## 2024-05-23 PROCEDURE — 3074F SYST BP LT 130 MM HG: CPT | Performed by: NURSE PRACTITIONER

## 2024-05-23 PROCEDURE — 3078F DIAST BP <80 MM HG: CPT | Performed by: NURSE PRACTITIONER

## 2024-05-23 NOTE — PROGRESS NOTES
[facility]    Obstetrics and Gynecology    Chief Complaint   Patient presents with    Gyn Exam     New patient, annual exam       Razia Sanford is a 54 year old female  Patient's last menstrual period was 03/15/2016 (approximate). presenting for annual gynecology exam.  New patient, last saw dr. Lee in .  Last menstrual period  no pelvic pain or bleeding since.she is , no sexually active as her  is 69 and had prostate cancer.  No abnormal vaginal discharge or odor, no urinary or bowel concerns.    Desires referral to weight loss provider. Having trouble with weight loss.    Pap:2017 NILM, negative HPV     Mammo: 2023 normal  Colonoscopy: - h.pylori on egd and colonoscopy normal - repeat in 7-10 years    OBSTETRICS HISTORY:  OB History    Para Term  AB Living   2 1 1 0 1 1   SAB IAB Ectopic Multiple Live Births   1 0 0 0 1       GYNE HISTORY:  Use of Birth Control (if yes, specify type): Postmenopausal (2024  3:12 PM)  Pap Date: 17 (2024  3:12 PM)  Pap Result Notes: NEG PAP / NEG HPV      MAMMO BILATERAL 20  BENIGN (2024  3:12 PM)      History   Sexual Activity    Sexual activity: Yes           Latest Ref Rng & Units 11/3/2017    10:55 AM   RECENT PAP RESULTS   Thinprep Pap  Negative for intraepithelial lesion or malignancy    HPV Negative Negative          MEDICAL HISTORY:  Past Medical History:    High cholesterol     Past Surgical History:   Procedure Laterality Date    Appendectomy      Colonoscopy N/A 2020    Procedure: COLONOSCOPY;  Surgeon: Yusuf Toussaint MD;  Location: [facility] ENDOSCOPY       SOCIAL HISTORY:  Social History     Socioeconomic History    Marital status:      Spouse name: Not on file    Number of children: Not on file    Years of education: Not on file    Highest education level: Not on file   Occupational History    Not on file   Tobacco Use    Smoking status: Former     Current  packs/day: 0.00     Types: Cigarettes     Start date: 1996     Quit date: 2001     Years since quittin.4     Passive exposure: Past    Smokeless tobacco: Never   Vaping Use    Vaping status: Never Used   Substance and Sexual Activity    Alcohol use: Yes     Alcohol/week: 0.0 standard drinks of alcohol     Comment: rare    Drug use: No    Sexual activity: Yes   Other Topics Concern     Service Not Asked    Blood Transfusions Not Asked    Caffeine Concern Yes     Comment: Tea, 2 cups daily.    Occupational Exposure Not Asked    Hobby Hazards Not Asked    Sleep Concern Not Asked    Stress Concern Not Asked    Weight Concern Not Asked    Special Diet Not Asked    Back Care Not Asked    Exercise Not Asked    Bike Helmet Not Asked    Seat Belt Not Asked    Self-Exams Not Asked    Grew up on a farm Not Asked    History of tanning Not Asked    Outdoor occupation Not Asked    Breast feeding Not Asked    Reaction to local anesthetic No   Social History Narrative    Not on file     Social Determinants of Health     Financial Resource Strain: Not on file   Food Insecurity: Not on file   Transportation Needs: Not on file   Physical Activity: Not on file   Stress: Not on file   Social Connections: Not on file   Housing Stability: Not on file         Depression Screening (PHQ-2/PHQ-9): Over the LAST 2 WEEKS   Little interest or pleasure in doing things: Not at all    Feeling down, depressed, or hopeless: Not at all    PHQ-2 SCORE: 0           FAMILY HISTORY:  Family History   Problem Relation Age of Onset    Cancer Father         ?prostate    Uterine Cancer Mother 85        (cause of death at age 85)    Cancer Brother 43        leg cancer (?) following accident    Cancer Nephew         stomach ca; son of brother with leg cancer    Prostate Cancer Half-Brother     Cancer Half-Sister         unknown primary    Breast Cancer Neg     Heart Attack Neg     Stroke Neg     Hypertension Neg     Ovarian Cancer Neg      Pancreatic Cancer Neg     Colon Cancer Neg        MEDICATIONS:  No current outpatient medications on file.    ALLERGIES:    Allergies   Allergen Reactions    Radiology Contrast Iodinated Dyes HIVES     And itching on back and legs         Review of Systems:  Constitutional:  Denies fatigue, night sweats, hot flashes  Eyes:  denies blurred or double vision  Cardiovascular:  denies chest pain or palpitations  Respiratory:  denies shortness of breath  Gastrointestinal:  denies heartburn, abdominal pain, diarrhea or constipation  Genitourinary:  denies dysuria, incontinence, abnormal vaginal discharge, vaginal itching,   Musculoskeletal:  denies back pain   Skin/Breast:  Denies any breast pain, lumps, or discharge.   Neurological:  denies headaches, extremity weakness or numbness.  Psychiatric: denies depression or anxiety.  Endocrine:   denies excessive thirst or urination.  Heme/Lymph:  denies history of anemia, easy bruising or bleeding.      PHYSICAL EXAM:     Vitals:    05/23/24 1514   BP: 100/65   Pulse: 76   Weight: 186 lb 6.4 oz (84.6 kg)   Height: 5' 2\" (1.575 m)       Body mass index is 34.09 kg/m².     Patient offered chaperone, patient declined    Constitutional: well developed, well nourished  Psychiatric:  Oriented to time, place, person and situation. Appropriate mood and affect  Head/Face: normocephalic  Neck/Thyroid: thyroid symmetric, no thyromegaly, no nodules, no adenopathy  Lymphatic:no abnormal supraclavicular or axillary adenopathy is noted  Breast: normal without palpable masses, tenderness, asymmetry, nipple discharge, nipple retraction or skin changes  Abdomen:  soft, nontender, nondistended, no masses  Skin/Hair: no unusual rashes or bruises  Extremities: no edema, no cyanosis    Pelvic Exam:  External Genitalia: normal appearance, hair distribution, and no lesions  Urethral Meatus:  normal in size, location, without lesions and prolapse  Bladder:  No fullness, masses or tenderness  Vagina:   Normal appearance without lesions, no abnormal discharge  Cervix:  Normal without tenderness on motion  Uterus: normal in size, contour, position, mobility, without tenderness  Adnexa: normal without masses or tenderness  Perineum: normal  Anus: no hemorroids     Assessment & Plan:    ICD-10-CM    1. Well woman exam with routine gynecological exam  Z01.419       2. Screening for cervical cancer  Z12.4 ThinPrep PAP Smear     Hpv Dna  High Risk , Thin Prep Collect      3. Encounter for screening mammogram for malignant neoplasm of breast  Z12.31 [facility] MAGDA 2D+3D SCREENING BILAT (CPT=77067/01265)      4. Obesity affecting pregnancy, antepartum, unspecified obesity type (HCC)  O99.210 [facility] - Dr. Leroy Delgado [facility] EMMG 9         Reviewed ASCCP guidelines with the patient   Pap today  Postmenopausal - rev'd to call if any bleeding  BMI 34- referred to dr. delgado  Breast Health:     Reviewed current guidelines with the patient and to start Mammograms at age 40 - ordered  Reviewed monthly self breast exams with the patient   Discussed diet, exercise, MVIs with Ca/Vit D  Follow up in 1 yr for YENNI Montgomery    This note was prepared using Dragon Medical voice recognition dictation software. As a result errors may occur. When identified these errors have been corrected. While every attempt is made to correct errors during dictation discrepancies may still exist.

## 2024-05-24 LAB — HPV I/H RISK 1 DNA SPEC QL NAA+PROBE: NEGATIVE

## 2024-10-16 ENCOUNTER — OFFICE VISIT (OUTPATIENT)
Dept: INTERNAL MEDICINE CLINIC | Facility: CLINIC | Age: 54
End: 2024-10-16

## 2024-10-16 VITALS
SYSTOLIC BLOOD PRESSURE: 124 MMHG | DIASTOLIC BLOOD PRESSURE: 74 MMHG | HEIGHT: 62 IN | OXYGEN SATURATION: 98 % | BODY MASS INDEX: 34.08 KG/M2 | WEIGHT: 185.19 LBS | HEART RATE: 80 BPM

## 2024-10-16 DIAGNOSIS — R05.1 ACUTE COUGH: Primary | ICD-10-CM

## 2024-10-16 PROCEDURE — 3074F SYST BP LT 130 MM HG: CPT | Performed by: INTERNAL MEDICINE

## 2024-10-16 PROCEDURE — 3008F BODY MASS INDEX DOCD: CPT | Performed by: INTERNAL MEDICINE

## 2024-10-16 PROCEDURE — 99213 OFFICE O/P EST LOW 20 MIN: CPT | Performed by: INTERNAL MEDICINE

## 2024-10-16 PROCEDURE — 3078F DIAST BP <80 MM HG: CPT | Performed by: INTERNAL MEDICINE

## 2024-10-16 RX ORDER — PREDNISONE 20 MG/1
40 TABLET ORAL DAILY
Qty: 10 TABLET | Refills: 0 | Status: SHIPPED | OUTPATIENT
Start: 2024-10-16 | End: 2024-10-21

## 2024-10-16 RX ORDER — BENZONATATE 200 MG/1
200 CAPSULE ORAL 3 TIMES DAILY PRN
Qty: 30 CAPSULE | Refills: 0 | Status: SHIPPED | OUTPATIENT
Start: 2024-10-16

## 2024-10-16 RX ORDER — AZITHROMYCIN 250 MG/1
TABLET, FILM COATED ORAL
Qty: 6 TABLET | Refills: 0 | Status: SHIPPED | OUTPATIENT
Start: 2024-10-16 | End: 2024-10-20

## 2024-10-16 NOTE — PATIENT INSTRUCTIONS
Please start the following medications:   Z pack   Benzonatate 200 mg three times a day as needed for cough suppression   Prednisone 40 mg once a day x 5 days    You can also try guaifenesin for help with coughing up mucus.

## 2024-10-16 NOTE — PROGRESS NOTES
Razia Sanford is a 54 year old female with complaints of:  Chief Complaint: Cough (Coughing and couldn't sleep at night. Chest is starting to hurt now.)    HPI     Razia Sanford is a(n) 54 year old female with a history of HLD, who presents for cough.     Patient states that she started to have a cough last week. Has been progressing, now preventing her from sleeping. She is starting to have rib pain with coughing. NO runny nose, no sore throat, no congestion, headache, fever, chills. Some wheezing.     Past Medical History     Past Medical History:    High cholesterol        Past Surgical History     Past Surgical History:   Procedure Laterality Date    Appendectomy      Colonoscopy N/A 2020    Procedure: COLONOSCOPY;  Surgeon: Yusuf Toussaint MD;  Location: [facility] ENDOSCOPY        Family History     Family History   Problem Relation Age of Onset    Cancer Father         ?prostate    Uterine Cancer Mother 85        (cause of death at age 85)    Cancer Brother 43        leg cancer (?) following accident    Cancer Nephew         stomach ca; son of brother with leg cancer    Prostate Cancer Half-Brother     Cancer Half-Sister         unknown primary    Breast Cancer Neg     Heart Attack Neg     Stroke Neg     Hypertension Neg     Ovarian Cancer Neg     Pancreatic Cancer Neg     Colon Cancer Neg        Social History     Social History     Socioeconomic History    Marital status:    Tobacco Use    Smoking status: Former     Current packs/day: 0.00     Types: Cigarettes     Start date: 1996     Quit date: 2001     Years since quittin.8     Passive exposure: Past    Smokeless tobacco: Never   Vaping Use    Vaping status: Never Used   Substance and Sexual Activity    Alcohol use: Yes     Alcohol/week: 0.0 standard drinks of alcohol     Comment: rare    Drug use: No    Sexual activity: Yes   Other Topics Concern    Caffeine Concern Yes     Comment: Tea, 2 cups daily.    Reaction to  local anesthetic No       Allergies     Allergies[1]    Current Medications     No current outpatient medications on file.     No current facility-administered medications for this visit.       Review of Systems     GENERAL HEALTH: feels well otherwise  SKIN: denies any unusual skin lesions or rashes  RESPIRATORY: denies shortness of breath with exertion  CARDIOVASCULAR: denies chest pain on exertion  GI: denies abdominal pain and denies heartburn  : denies any burning with urination, urinary frequency or urgency  NEURO: denies headaches, numbness or tingling, mental status changes  PSYCH: denies depressed mood, anxiety  MUSC: denies muscle aches, joint pain    Physical Exam     /74 (BP Location: Right arm, Patient Position: Sitting, Cuff Size: adult)   Pulse 80   Ht 5' 2\" (1.575 m)   Wt 185 lb 3.2 oz (84 kg)   LMP 03/15/2016 (Approximate)   SpO2 98%   BMI 33.87 kg/m²     GENERAL: well developed, well nourished,in no apparent distress  SKIN: no rashes,no suspicious lesions  HEENT: atraumatic, normocephalic,ears and throat are clear  NECK: supple,no adenopathy,no bruits  LUNGS: clear to auscultation  CARDIO: RRR without murmur  GI: good BS's,no masses, HSM or tenderness  EXTREMITIES: no cyanosis, clubbing or edema    Assessment and Plan     Diagnoses and all orders for this visit:    Acute cough. Post viral cough vs bronchitis. Treat with following regimen, RTO 2 weeks if not improved.   -     azithromycin (ZITHROMAX Z-PRIYA) 250 MG Oral Tab; Take 2 tablets (500 mg total) by mouth daily for 1 day, THEN 1 tablet (250 mg total) daily for 4 days.  -     predniSONE 20 MG Oral Tab; Take 2 tablets (40 mg total) by mouth daily for 5 days.  -     benzonatate 200 MG Oral Cap; Take 1 capsule (200 mg total) by mouth 3 (three) times daily as needed for cough.          azithromycin (ZITHROMAX Z-PRIYA) 250 MG Oral Tab Take 2 tablets (500 mg total) by mouth daily for 1 day, THEN 1 tablet (250 mg total) daily for 4 days. 6  tablet 0    predniSONE 20 MG Oral Tab Take 2 tablets (40 mg total) by mouth daily for 5 days. 10 tablet 0    benzonatate 200 MG Oral Cap Take 1 capsule (200 mg total) by mouth 3 (three) times daily as needed for cough. 30 capsule 0       Requested Prescriptions     Signed Prescriptions Disp Refills    azithromycin (ZITHROMAX Z-PRIYA) 250 MG Oral Tab 6 tablet 0     Sig: Take 2 tablets (500 mg total) by mouth daily for 1 day, THEN 1 tablet (250 mg total) daily for 4 days.    predniSONE 20 MG Oral Tab 10 tablet 0     Sig: Take 2 tablets (40 mg total) by mouth daily for 5 days.    benzonatate 200 MG Oral Cap 30 capsule 0     Sig: Take 1 capsule (200 mg total) by mouth 3 (three) times daily as needed for cough.       No orders of the defined types were placed in this encounter.      No follow-ups on file.    The patient indicates understanding of these issues and agrees to the plan.    Electronically signed by Mel Soto MD 10/16/24             [1]   Allergies  Allergen Reactions    Radiology Contrast Iodinated Dyes HIVES     And itching on back and legs

## 2025-01-03 LAB — AMB EXT COVID-19 RESULT: DETECTED

## 2025-01-04 ENCOUNTER — NURSE TRIAGE (OUTPATIENT)
Dept: INTERNAL MEDICINE CLINIC | Facility: CLINIC | Age: 55
End: 2025-01-04

## 2025-01-04 ENCOUNTER — HOSPITAL ENCOUNTER (OUTPATIENT)
Age: 55
Discharge: HOME | End: 2025-01-04
Payer: COMMERCIAL

## 2025-01-04 VITALS
RESPIRATION RATE: 16 BRPM | HEART RATE: 89 BPM | OXYGEN SATURATION: 95 % | TEMPERATURE: 98 F | SYSTOLIC BLOOD PRESSURE: 128 MMHG | DIASTOLIC BLOOD PRESSURE: 72 MMHG

## 2025-01-04 DIAGNOSIS — U07.1 COVID: Primary | ICD-10-CM

## 2025-01-04 PROCEDURE — 99213 OFFICE O/P EST LOW 20 MIN: CPT | Performed by: NURSE PRACTITIONER

## 2025-01-04 NOTE — DISCHARGE INSTRUCTIONS
Take over-the-counter Tylenol ibuprofen with food on the stomach for body aches chills pain or discomfort you have until Tuesday 01/07/2025 to start the Paxlovid or you may start it right now.  It can give you rebound COVID 2 weeks or a month later and may leave a metallic taste in your mouth and it can give you GI upset like vomiting and diarrhea.  Drink plenty of fluids warm tea with honey eat what you can follow-up with your primary care provider in a week if you develop chest pain shortness of breath vomiting cannot keep anything down diarrhea high fever or any new or worsening symptoms go to the nearest emergency department.

## 2025-01-04 NOTE — TELEPHONE ENCOUNTER
Action Requested: Summary for Provider     []  Critical Lab, Recommendations Needed  [] Need Additional Advice  [x]   FYI    []   Need Orders  [] Need Medications Sent to Pharmacy  []  Other     SUMMARY: Patient advised immediate care. Patient asking to be seen. Patient will wear a mask.    Patient states she started feeling sick yesterday with a sore throat and 99.8 temperature. Last night she took a home test that was positive for covid. Patient asking for Paxlovid to be prescribed. Patient denies shortness of breath and chest pain    Patient advised home care and CDC guidelines.     Reason for call: Fever  Onset: yesterday    Reason for Disposition   COVID-19 diagnosed by positive lab test (e.g., PCR, rapid self-test kit) and mild symptoms (e.g., cough, fever, others) and no complications or SOB    Protocols used: Coronavirus (COVID-19) Diagnosed or [id number]

## 2025-01-04 NOTE — TELEPHONE ENCOUNTER
Patient called and said she is having a sore throat, headache, body pains and last night had a fever. Is requesting to speak to a nurse said she's not sure if she has covid.

## 2025-01-04 NOTE — ED PROVIDER NOTES
Patient Seen in: [facility]      History     Chief Complaint   Patient presents with    Covid     Stated Complaint: tested postive for COVID at home last night    Subjective:   HPI      This is a 54-year-old female with history of high cholesterol currently not on any statins presenting for Paxlovid.  Patient states yesterday she started with nasal congestion body aches and a headache did a home COVID test that was positive for COVID and is here because she would like Paxlovid.  Denies fever chest pain shortness of breath vomiting or diarrhea.    Objective:     Past Medical History:    High cholesterol              Past Surgical History:   Procedure Laterality Date    Appendectomy      Colonoscopy N/A 2020    Procedure: COLONOSCOPY;  Surgeon: Yusuf Toussaint MD;  Location: [facility] ENDOSCOPY                Social History     Socioeconomic History    Marital status:    Tobacco Use    Smoking status: Former     Current packs/day: 0.00     Types: Cigarettes     Start date: 1996     Quit date: 2001     Years since quittin.0     Passive exposure: Past    Smokeless tobacco: Never   Vaping Use    Vaping status: Never Used   Substance and Sexual Activity    Alcohol use: Yes     Alcohol/week: 0.0 standard drinks of alcohol     Comment: rare    Drug use: No    Sexual activity: Yes   Other Topics Concern    Caffeine Concern Yes     Comment: Tea, 2 cups daily.    Reaction to local anesthetic No              Review of Systems    Positive for stated complaint: tested postive for COVID at home last night  Other systems are as noted in HPI.  Constitutional and vital signs reviewed.      All other systems reviewed and negative except as noted above.    Physical Exam     ED Triage Vitals [25 1150]   /72   Pulse 89   Resp 16   Temp 98.3 °F (36.8 °C)   Temp src Oral   SpO2 95 %   O2 Device None (Room air)       Current Vitals:   Vital Signs  BP: 128/72  Pulse: 89  Resp: 16  Temp:  98.3 °F (36.8 °C)  Temp src: Oral    Oxygen Therapy  SpO2: 95 %  O2 Device: None (Room air)        Physical Exam  Vitals and nursing note reviewed.   Constitutional:       Appearance: Normal appearance.   HENT:      Right Ear: Tympanic membrane normal.      Left Ear: Tympanic membrane normal.      Nose: Congestion present. No rhinorrhea.      Mouth/Throat:      Mouth: Mucous membranes are moist.      Pharynx: Oropharynx is clear. No posterior oropharyngeal erythema.   Eyes:      Conjunctiva/sclera: Conjunctivae normal.   Cardiovascular:      Rate and Rhythm: Normal rate.   Pulmonary:      Effort: Pulmonary effort is normal. No respiratory distress.      Breath sounds: Normal breath sounds. No wheezing.   Musculoskeletal:         General: Normal range of motion.      Cervical back: Normal range of motion. No rigidity or tenderness.   Lymphadenopathy:      Cervical: No cervical adenopathy.   Skin:     General: Skin is warm and dry.      Capillary Refill: Capillary refill takes less than 2 seconds.   Neurological:      General: No focal deficit present.      Mental Status: She is alert and oriented to person, place, and time.   Psychiatric:         Mood and Affect: Mood normal.             ED Course   Labs Reviewed - No data to display          MDM         Medical Decision Making  54-year-old female nontoxic-appearing afebrile no hypoxia distress with a home COVID test that is positive here for Paxlovid.  No clinical indication for swabs labs or imaging.  Patient will be prescribed Paxlovid discussed possible side effects of this medication for the patient she is only on a multivitamin at home she is not on any statins and no other medications.  Discussed outpatient follow-up all education instructions including ER precautions placed in discharge paperwork.  Patient acknowledges understanding discharge instructions.    Problems Addressed:  COVID: acute illness or injury    Risk  OTC drugs.  Prescription drug  management.        Disposition and Plan     Clinical Impression:  1. COVID         Disposition:  Discharge  1/4/2025 12:03 pm    Follow-up:  Saad Michael MD  [address] [place], [place] [id number] [phone]    In 1 week            Medications Prescribed:  Discharge Medication List as of 1/4/2025 12:04 PM        START taking these medications    Details   nirmatrelvir-ritonavir 300-100 MG Oral Tablet Therapy Pack Take two nirmatrelvir tablets (300mg) with one ritonavir tablet (100mg) together twice daily for 5 days., Normal, Disp-30 tablet, R-0                 Supplementary Documentation:

## 2025-01-13 ENCOUNTER — OFFICE VISIT (OUTPATIENT)
Dept: INTERNAL MEDICINE CLINIC | Facility: CLINIC | Age: 55
End: 2025-01-13

## 2025-01-13 ENCOUNTER — LAB ENCOUNTER (OUTPATIENT)
Dept: LAB | Age: 55
End: 2025-01-13
Attending: NURSE PRACTITIONER
Payer: COMMERCIAL

## 2025-01-13 ENCOUNTER — NURSE TRIAGE (OUTPATIENT)
Dept: INTERNAL MEDICINE CLINIC | Facility: CLINIC | Age: 55
End: 2025-01-13

## 2025-01-13 VITALS
SYSTOLIC BLOOD PRESSURE: 124 MMHG | WEIGHT: 184.81 LBS | BODY MASS INDEX: 34.01 KG/M2 | DIASTOLIC BLOOD PRESSURE: 70 MMHG | OXYGEN SATURATION: 97 % | HEIGHT: 62 IN | HEART RATE: 91 BPM

## 2025-01-13 DIAGNOSIS — N30.01 ACUTE CYSTITIS WITH HEMATURIA: ICD-10-CM

## 2025-01-13 DIAGNOSIS — N30.01 ACUTE CYSTITIS WITH HEMATURIA: Primary | ICD-10-CM

## 2025-01-13 LAB
BILIRUB UR QL CFM: NEGATIVE
RBC #/AREA URNS AUTO: >10 /HPF
RBC #/AREA URNS AUTO: >10 /HPF
SP GR UR STRIP: 1.01 (ref 1–1.03)
WBC #/AREA URNS AUTO: >50 /HPF
WBC #/AREA URNS AUTO: >50 /HPF

## 2025-01-13 PROCEDURE — 3078F DIAST BP <80 MM HG: CPT | Performed by: NURSE PRACTITIONER

## 2025-01-13 PROCEDURE — 81015 MICROSCOPIC EXAM OF URINE: CPT

## 2025-01-13 PROCEDURE — 3008F BODY MASS INDEX DOCD: CPT | Performed by: NURSE PRACTITIONER

## 2025-01-13 PROCEDURE — 3074F SYST BP LT 130 MM HG: CPT | Performed by: NURSE PRACTITIONER

## 2025-01-13 PROCEDURE — 81001 URINALYSIS AUTO W/SCOPE: CPT

## 2025-01-13 PROCEDURE — 87088 URINE BACTERIA CULTURE: CPT

## 2025-01-13 PROCEDURE — 99214 OFFICE O/P EST MOD 30 MIN: CPT | Performed by: NURSE PRACTITIONER

## 2025-01-13 PROCEDURE — 87186 SC STD MICRODIL/AGAR DIL: CPT

## 2025-01-13 PROCEDURE — 87086 URINE CULTURE/COLONY COUNT: CPT

## 2025-01-13 RX ORDER — CIPROFLOXACIN 500 MG/1
500 TABLET, FILM COATED ORAL 2 TIMES DAILY
Qty: 10 TABLET | Refills: 0 | Status: SHIPPED | OUTPATIENT
Start: 2025-01-13 | End: 2025-01-13

## 2025-01-13 RX ORDER — CIPROFLOXACIN 500 MG/1
500 TABLET, FILM COATED ORAL 2 TIMES DAILY
Qty: 10 TABLET | Refills: 0 | Status: SHIPPED | OUTPATIENT
Start: 2025-01-13 | End: 2025-01-18

## 2025-01-13 NOTE — PROGRESS NOTES
HPI:    Patient ID: Razia Sanford is a 54 year old female.    HPI UTI Symptoms  54 year old female who is having bloody urine.  No back Pain  +Chills  + Nausea  UTI symptoms include -Urgency, frequency,burning and painful urination.      Immunization History   Administered Date(s) Administered    Covid-19 Vaccine Pfizer 30 mcg/0.3 ml 2021, 2021, 2021    FLULAVAL 6 months & older 0.5 ml Prefilled syringe (93648) 2017, 2020, 2022    FLUZONE 6 months and older PFS 0.5 ml (56835) 10/24/2016, 2023    Influenza 10/20/2010, 10/26/2015    Pfizer Covid-19 Vaccine 30mcg/0.3ml 12yrs+ 2023, 2024       Past Medical History:    High cholesterol      Past Surgical History:   Procedure Laterality Date    Appendectomy      Colonoscopy N/A 2020    Procedure: COLONOSCOPY;  Surgeon: Yusuf Toussaint MD;  Location: [facility] ENDOSCOPY      Social History     Socioeconomic History    Marital status:    Tobacco Use    Smoking status: Former     Current packs/day: 0.00     Types: Cigarettes     Start date: 1996     Quit date: 2001     Years since quittin.0     Passive exposure: Past    Smokeless tobacco: Never   Vaping Use    Vaping status: Never Used   Substance and Sexual Activity    Alcohol use: Yes     Alcohol/week: 0.0 standard drinks of alcohol     Comment: rare    Drug use: No    Sexual activity: Yes   Other Topics Concern    Caffeine Concern Yes     Comment: Tea, 2 cups daily.    Reaction to local anesthetic No          Review of Systems   Constitutional:  Negative for chills, fatigue and fever.   HENT:  Negative for ear pain, hearing loss, sinus pain, sore throat and trouble swallowing.    Eyes:  Negative for pain and visual disturbance.   Respiratory:  Negative for cough, chest tightness and shortness of breath.    Cardiovascular:  Negative for chest pain, palpitations and leg swelling.   Gastrointestinal:  Negative for abdominal pain,  constipation, diarrhea, nausea and vomiting.   Endocrine: Negative for cold intolerance and heat intolerance.   Genitourinary:  Positive for dysuria, frequency, hematuria and urgency.   Musculoskeletal:  Negative for back pain and joint swelling.   Skin:  Negative for rash.   Allergic/Immunologic: Negative for environmental allergies.   Neurological:  Negative for weakness, numbness and headaches.   Hematological:  Does not bruise/bleed easily.   Psychiatric/Behavioral:  Negative for dysphoric mood and sleep disturbance. The patient is not nervous/anxious.               Current Outpatient Medications   Medication Sig Dispense Refill    ciprofloxacin 500 MG Oral Tab Take 1 tablet (500 mg total) by mouth 2 (two) times daily for 5 days. 10 tablet 0    benzonatate 200 MG Oral Cap Take 1 capsule (200 mg total) by mouth 3 (three) times daily as needed for cough. (Patient not taking: Reported on 1/13/2025) 30 capsule 0     Allergies:Allergies[1]   PHYSICAL EXAM:   Physical Exam  Constitutional:       Appearance: Normal appearance. She is well-developed. She is ill-appearing.   HENT:      Head: Normocephalic.      Right Ear: Tympanic membrane normal.      Left Ear: Tympanic membrane normal.      Nose: Nose normal.      Mouth/Throat:      Mouth: Mucous membranes are moist.      Pharynx: No oropharyngeal exudate or posterior oropharyngeal erythema.   Eyes:      General:         Right eye: No discharge.         Left eye: No discharge.      Pupils: Pupils are equal, round, and reactive to light.   Cardiovascular:      Rate and Rhythm: Normal rate and regular rhythm.      Heart sounds: Normal heart sounds. No murmur heard.     No friction rub. No gallop.   Pulmonary:      Effort: Pulmonary effort is normal. No respiratory distress.      Breath sounds: Normal breath sounds. No wheezing, rhonchi or rales.   Abdominal:      General: Bowel sounds are normal. There is no distension.      Palpations: Abdomen is soft. There is no mass.       Tenderness: There is no abdominal tenderness. There is no right CVA tenderness, left CVA tenderness or guarding.   Musculoskeletal:         General: No tenderness.      Cervical back: Normal range of motion and neck supple. No tenderness.      Right lower leg: No edema.      Left lower leg: No edema.   Lymphadenopathy:      Cervical: No cervical adenopathy.   Skin:     General: Skin is warm and dry.      Findings: No rash.   Neurological:      Mental Status: She is alert and oriented to person, place, and time.      Coordination: Coordination normal.      Gait: Gait normal.   Psychiatric:         Mood and Affect: Mood normal.         Behavior: Behavior normal.         Thought Content: Thought content normal.         Judgment: Judgment normal.       /70 (BP Location: Right arm, Patient Position: Sitting, Cuff Size: adult)   Pulse 91   Ht 5' 2\" (1.575 m)   Wt 184 lb 12.8 oz (83.8 kg)   LMP 03/15/2016 (Approximate)   SpO2 97%   BMI 33.80 kg/m²   Wt Readings from Last 2 Encounters:   01/13/25 184 lb 12.8 oz (83.8 kg)   10/16/24 185 lb 3.2 oz (84 kg)     Body mass index is 33.8 kg/m².(2)  Lab Results   Component Value Date    WBC 6.7 06/27/2023    RBC 4.07 06/27/2023    HGB 12.5 06/27/2023    HCT 35.7 06/27/2023    MCV 87.7 06/27/2023    MCH 30.7 06/27/2023    MCHC 35.0 06/27/2023    RDW 14.5 06/27/2023    .0 06/27/2023    MPV 8.7 05/16/2017      Lab Results   Component Value Date     (H) 06/27/2023    BUN 10 06/27/2023    BUNCREA 14.9 06/27/2023    CREATSERUM 0.67 06/27/2023    ANIONGAP 5 06/27/2023    GFRNAA 107 02/23/2022    GFRAA 124 02/23/2022    CA 8.9 06/27/2023    OSMOCALC 290 06/27/2023    ALKPHO 85 02/23/2022    AST 56 (H) 02/23/2022    ALT 94 (H) 02/23/2022    ALKPHOS 63 02/08/2012    BILT 0.6 02/23/2022    TP 7.2 02/23/2022    ALB 4.2 02/23/2022    GLOBULIN 3.0 02/23/2022    AGRATIO 1.3 02/08/2012     06/27/2023    K 3.9 06/27/2023     06/27/2023    CO2 26.0  06/27/2023      Lab Results   Component Value Date     02/23/2022    A1C 5.9 (H) 02/23/2022      Lab Results   Component Value Date    CHOLEST 177 02/23/2022    TRIG 184 (H) 02/23/2022    HDL 41 02/23/2022     (H) 02/23/2022    VLDL 31 (H) 02/23/2022    NONHDLC 136 (H) 02/23/2022      Lab Results   Component Value Date    T4F 1.0 02/23/2022    TSH 1.830 02/23/2022                ASSESSMENT/PLAN:     Problem List Items Addressed This Visit       Acute cystitis with hematuria - Primary     UTI symptoms include -Urgency, frequency,burning and painful urination.  +Chills  + Hematuria    Plan  Hydrate with water  Urinalysis with reflex culture    ciprofloxacin 500 MG Oral Tab          Take 1 tablet (500 mg total) by mouth 2 (two) times daily for 5 days., Normal, Disp-10 tablet, R-0               Relevant Orders    Urinalysis, Routine [E]    Urine Culture, Routine [E]          Orders Placed This Encounter   Procedures    Urinalysis, Routine [E]    Urine Culture, Routine [E]       Meds This Visit:  Requested Prescriptions     Signed Prescriptions Disp Refills    ciprofloxacin 500 MG Oral Tab 10 tablet 0     Sig: Take 1 tablet (500 mg total) by mouth 2 (two) times daily for 5 days.       Imaging & Referrals:  None         YENNI Barrera          [1]   Allergies  Allergen Reactions    Radiology Contrast Iodinated Dyes HIVES     And itching on back and legs

## 2025-01-13 NOTE — TELEPHONE ENCOUNTER
Action Requested: Summary for Provider     []  Critical Lab, Recommendations Needed  [] Need Additional Advice  []   FYI    []   Need Orders  [] Need Medications Sent to Pharmacy  []  Other     SUMMARY: Per Protocol disposition advised to be seen in the office today for urinary symptoms.  Future Appointments   Date Time Provider Department Center   2025  2:40 PM Lay Callejas APRN [facility]     Reason for call: Urinary Symptoms  Onset: today    Patient (name and  verified) calling with urinary symptoms that started today. Denies any fever, back or abd pain.   Reason for Disposition   Urinating more frequently than usual (i.e., frequency)    Protocols used: Urinary Symptoms-A-OH

## 2025-01-13 NOTE — ASSESSMENT & PLAN NOTE
UTI symptoms include -Urgency, frequency,burning and painful urination.  +Chills  + Hematuria    Plan  Hydrate with water  Urinalysis with reflex culture    ciprofloxacin 500 MG Oral Tab          Take 1 tablet (500 mg total) by mouth 2 (two) times daily for 5 days., Normal, Disp-10 tablet, R-0

## 2025-02-13 ENCOUNTER — OFFICE VISIT (OUTPATIENT)
Dept: INTERNAL MEDICINE CLINIC | Facility: CLINIC | Age: 55
End: 2025-02-13

## 2025-02-13 VITALS
DIASTOLIC BLOOD PRESSURE: 75 MMHG | WEIGHT: 187 LBS | BODY MASS INDEX: 34.41 KG/M2 | HEIGHT: 62 IN | RESPIRATION RATE: 16 BRPM | SYSTOLIC BLOOD PRESSURE: 136 MMHG | HEART RATE: 105 BPM | TEMPERATURE: 100 F

## 2025-02-13 DIAGNOSIS — R68.89 FLU-LIKE SYMPTOMS: ICD-10-CM

## 2025-02-13 DIAGNOSIS — J02.9 SORE THROAT: Primary | ICD-10-CM

## 2025-02-13 LAB
CONTROL LINE PRESENT WITH A CLEAR BACKGROUND (YES/NO): YES YES/NO
KIT LOT #: NORMAL NUMERIC
STREP GRP A CUL-SCR: NEGATIVE

## 2025-02-13 PROCEDURE — 3008F BODY MASS INDEX DOCD: CPT | Performed by: NURSE PRACTITIONER

## 2025-02-13 PROCEDURE — 3078F DIAST BP <80 MM HG: CPT | Performed by: NURSE PRACTITIONER

## 2025-02-13 PROCEDURE — 99213 OFFICE O/P EST LOW 20 MIN: CPT | Performed by: NURSE PRACTITIONER

## 2025-02-13 PROCEDURE — 87880 STREP A ASSAY W/OPTIC: CPT | Performed by: NURSE PRACTITIONER

## 2025-02-13 PROCEDURE — 3075F SYST BP GE 130 - 139MM HG: CPT | Performed by: NURSE PRACTITIONER

## 2025-02-13 NOTE — PATIENT INSTRUCTIONS
Continue tylenol and or Advil as needed for body aches, fever and sore throat.     I will contact you tomorrow regarding your symptoms.

## 2025-02-13 NOTE — PROGRESS NOTES
Razia Sanford is a 54 year old female.  Chief Complaint   Patient presents with    Cough     Congestion, body aches      HPI:   She presents with chills, fevers, fatigue, sore throat and pain with swallowing.     She has taken aleve with little relief.     Symptoms started yesterday. She has been around a lot of sick co-workers.     No chest pain or SOB.   No current outpatient medications on file.      Past Medical History:    High cholesterol      Past Surgical History:   Procedure Laterality Date    Appendectomy      Colonoscopy N/A 2020    Procedure: COLONOSCOPY;  Surgeon: Yusuf Toussaint MD;  Location: [facility] ENDOSCOPY      Social History:  Social History     Socioeconomic History    Marital status:    Tobacco Use    Smoking status: Former     Current packs/day: 0.00     Types: Cigarettes     Start date: 1996     Quit date: 2001     Years since quittin.1     Passive exposure: Past    Smokeless tobacco: Never   Vaping Use    Vaping status: Never Used   Substance and Sexual Activity    Alcohol use: Yes     Alcohol/week: 0.0 standard drinks of alcohol     Comment: rare    Drug use: No    Sexual activity: Yes   Other Topics Concern    Caffeine Concern Yes     Comment: Tea, 2 cups daily.    Reaction to local anesthetic No     Social Drivers of Health     Food Insecurity: No Food Insecurity (2025)    NCSS - Food Insecurity     Worried About Running Out of Food in the Last Year: No     Ran Out of Food in the Last Year: No   Transportation Needs: No Transportation Needs (2025)    NCSS - Transportation     Lack of Transportation: No   Housing Stability: At Risk (2025)    NCSS - Housing/Utilities     Has Housing: Yes     Worried About Losing Housing: No     Unable to Get Utilities: Yes      Family History   Problem Relation Age of Onset    Cancer Father         ?prostate    Uterine Cancer Mother 85        (cause of death at age 85)    Cancer Brother 43        leg cancer  (?) following accident    Cancer Nephew         stomach ca; son of brother with leg cancer    Prostate Cancer Half-Brother     Cancer Half-Sister         unknown primary    Breast Cancer Neg     Heart Attack Neg     Stroke Neg     Hypertension Neg     Ovarian Cancer Neg     Pancreatic Cancer Neg     Colon Cancer Neg       Allergies[1]     REVIEW OF SYSTEMS:     Review of Systems   Constitutional:  Positive for chills, fatigue and fever.   HENT:  Positive for sore throat. Negative for congestion, ear pain, postnasal drip and rhinorrhea.    Respiratory:  Negative for cough, shortness of breath and wheezing.    Cardiovascular:  Negative for chest pain.   Gastrointestinal: Negative.    Genitourinary: Negative.    Musculoskeletal:  Negative for arthralgias.   Skin: Negative.    Neurological:  Negative for dizziness and headaches.   Psychiatric/Behavioral: Negative.        Wt Readings from Last 5 Encounters:   02/13/25 187 lb (84.8 kg)   01/13/25 184 lb 12.8 oz (83.8 kg)   10/16/24 185 lb 3.2 oz (84 kg)   05/23/24 186 lb 6.4 oz (84.6 kg)   12/04/23 177 lb (80.3 kg)     Body mass index is 34.2 kg/m².      EXAM:   /75 (BP Location: Right arm, Patient Position: Sitting, Cuff Size: large)   Pulse 105   Temp 100.3 °F (37.9 °C)   Resp 16   Ht 5' 2\" (1.575 m)   Wt 187 lb (84.8 kg)   LMP 03/15/2016 (Approximate)   BMI 34.20 kg/m²     Physical Exam  Vitals reviewed.   Constitutional:       Appearance: Normal appearance.   HENT:      Head: Normocephalic.      Right Ear: Tympanic membrane normal.      Left Ear: Tympanic membrane normal.      Nose: No congestion.      Mouth/Throat:      Pharynx: Posterior oropharyngeal erythema present.   Cardiovascular:      Rate and Rhythm: Normal rate and regular rhythm.      Pulses: Normal pulses.   Pulmonary:      Breath sounds: Normal breath sounds. No wheezing.   Musculoskeletal:         General: No swelling. Normal range of motion.   Skin:     General: Skin is warm and dry.    Neurological:      Mental Status: She is alert and oriented to person, place, and time.   Psychiatric:         Mood and Affect: Mood normal.         Behavior: Behavior normal.            ASSESSMENT AND PLAN:   1. Sore throat  - POC Rapid Strep [89221]  - Grp A Strep Cult, Throat [E]; Future  - SARS-CoV-2/Flu A and B/RSV by PCR (Alinity); Future  - SARS-CoV-2/Flu A and B/RSV by PCR (Alinity)  - Grp A Strep Cult, Throat [E]    2. Flu-like symptoms  - SARS-CoV-2/Flu A and B/RSV by PCR (Alinity); Future  - SARS-CoV-2/Flu A and B/RSV by PCR (Alinity)        The patient indicates understanding of these issues and agrees to the plan.  Return for if symptoms do not resolve.       [1]   Allergies  Allergen Reactions    Radiology Contrast Iodinated Dyes HIVES     And itching on back and legs

## 2025-02-14 ENCOUNTER — TELEPHONE (OUTPATIENT)
Dept: INTERNAL MEDICINE CLINIC | Facility: CLINIC | Age: 55
End: 2025-02-14

## 2025-02-14 LAB
FLUAV + FLUBV RNA SPEC NAA+PROBE: NOT DETECTED
FLUAV + FLUBV RNA SPEC NAA+PROBE: NOT DETECTED
RSV RNA SPEC NAA+PROBE: NOT DETECTED
SARS-COV-2 RNA RESP QL NAA+PROBE: NOT DETECTED

## 2025-02-15 NOTE — TELEPHONE ENCOUNTER
On-call page received on 2/14/2025 and returned.    Complaint: Sore throat, difficulty swallowing    Solution: Patient calling and very upset about evolution of her symptoms.  She has had a 24-hour history of sore throats and difficulty swallowing.  Saw provider in the office yesterday.  Rapid strep test was negative.  COVID/influenza/RSV lab test was negative as well.  Awaiting group A strep culture results from the lab.  She is upset that she was called at 5 PM with her results.  She states she was told she would get an antibiotic if throat still hurts.  Patient very difficult to deal with as she was very upset.  Offered to send her antibiotics but explained this likely will not accelerate her getting better.  Offered symptomatic treatment such as warm fluids, salt water gargling, lozenges, Tylenol/NSAIDs.  Patient then questioning why antibiotics were being sent if this author did not think they were of benefit.  Explained to patient that you are being sent because she said another provider would prescribe them if she continued to have a sore throat after 24 hours.  Explained that typically symptoms peak at 48 to 72 hours and then get better and antibiotics would not likely accelerate this.  Patient became frustrated and stated not to send anything to her pharmacy.

## 2025-06-23 ENCOUNTER — HOSPITAL ENCOUNTER (OUTPATIENT)
Age: 55
Discharge: HOME | End: 2025-06-23
Payer: COMMERCIAL

## 2025-06-23 ENCOUNTER — APPOINTMENT (OUTPATIENT)
Dept: GENERAL RADIOLOGY | Age: 55
End: 2025-06-23
Attending: PHYSICIAN ASSISTANT
Payer: COMMERCIAL

## 2025-06-23 VITALS
RESPIRATION RATE: 18 BRPM | TEMPERATURE: 100 F | DIASTOLIC BLOOD PRESSURE: 62 MMHG | HEART RATE: 90 BPM | SYSTOLIC BLOOD PRESSURE: 131 MMHG | OXYGEN SATURATION: 100 %

## 2025-06-23 DIAGNOSIS — J18.9 COMMUNITY ACQUIRED PNEUMONIA OF LEFT LOWER LOBE OF LUNG: Primary | ICD-10-CM

## 2025-06-23 DIAGNOSIS — Z20.822 ENCOUNTER FOR LABORATORY TESTING FOR COVID-19 VIRUS: ICD-10-CM

## 2025-06-23 DIAGNOSIS — R50.9 FEVER, UNSPECIFIED FEVER CAUSE: ICD-10-CM

## 2025-06-23 LAB
GLUCOSE BLDC GLUCOMTR-MCNC: 101 MG/DL (ref 70–99)
POCT INFLUENZA A: NEGATIVE
POCT INFLUENZA B: NEGATIVE
SARS-COV-2 RNA RESP QL NAA+PROBE: NOT DETECTED

## 2025-06-23 PROCEDURE — 99213 OFFICE O/P EST LOW 20 MIN: CPT | Performed by: PHYSICIAN ASSISTANT

## 2025-06-23 PROCEDURE — 71046 X-RAY EXAM CHEST 2 VIEWS: CPT | Performed by: PHYSICIAN ASSISTANT

## 2025-06-23 PROCEDURE — U0002 COVID-19 LAB TEST NON-CDC: HCPCS | Performed by: PHYSICIAN ASSISTANT

## 2025-06-23 PROCEDURE — 82962 GLUCOSE BLOOD TEST: CPT | Performed by: PHYSICIAN ASSISTANT

## 2025-06-23 PROCEDURE — 87502 INFLUENZA DNA AMP PROBE: CPT | Performed by: PHYSICIAN ASSISTANT

## 2025-06-23 RX ORDER — ALBUTEROL SULFATE 90 UG/1
2 INHALANT RESPIRATORY (INHALATION) EVERY 4 HOURS PRN
Qty: 1 EACH | Refills: 0 | Status: SHIPPED | OUTPATIENT
Start: 2025-06-23 | End: 2025-07-23

## 2025-06-23 RX ORDER — AZITHROMYCIN 250 MG/1
TABLET, FILM COATED ORAL
Qty: 6 TABLET | Refills: 0 | Status: SHIPPED | OUTPATIENT
Start: 2025-06-23 | End: 2025-06-28

## 2025-06-23 RX ORDER — AMOXICILLIN 500 MG/1
1000 TABLET, FILM COATED ORAL 3 TIMES DAILY
Qty: 60 TABLET | Refills: 0 | Status: SHIPPED | OUTPATIENT
Start: 2025-06-23 | End: 2025-07-03

## 2025-06-23 NOTE — ED PROVIDER NOTES
Patient Seen in: [facility]        History  Chief Complaint   Patient presents with    Shortness Of Breath     Stated Complaint: fever,  shortness of breah, headache    Subjective:   HPI          Patient is a 55-year-old female with history of hyperlipidemia who presents to the immediate care for evaluation of subjective fevers, chills, headache and shortness of breath x 4 days.  Patient states that symptoms started initially with feeling chills and feverish but she has not taken her temperature.  She reports feeling somewhat short of breath but no respiratory symptoms such as cough or congestion or sore throat.  She denies any history of pulmonary disease or smoking.  She also denies any urinary symptoms such as dysuria or frequency.  No known sick contacts.  She has been taking ibuprofen for her symptoms with mild relief.  No chest pain or abdominal pain.  No vision changes, numbness or weakness or loss of sensation arms or legs.  She also reports recently switching to a diet without sugar and she is wondering if this is contributing to her symptoms as well.      Objective:     Past Medical History:    High cholesterol              Past Surgical History:   Procedure Laterality Date    Appendectomy      Colonoscopy N/A 2020    Procedure: COLONOSCOPY;  Surgeon: Yusuf Toussaint MD;  Location: [facility] ENDOSCOPY                Social History     Socioeconomic History    Marital status:    Tobacco Use    Smoking status: Former     Current packs/day: 0.00     Types: Cigarettes     Start date: 1996     Quit date: 2001     Years since quittin.4     Passive exposure: Past    Smokeless tobacco: Never   Vaping Use    Vaping status: Never Used   Substance and Sexual Activity    Alcohol use: Yes     Alcohol/week: 0.0 standard drinks of alcohol     Comment: rare    Drug use: No    Sexual activity: Yes   Other Topics Concern    Caffeine Concern Yes     Comment: Tea, 2 cups daily.     Reaction to local anesthetic No     Social Drivers of Health     Food Insecurity: No Food Insecurity (1/13/2025)    NCSS - Food Insecurity     Worried About Running Out of Food in the Last Year: No     Ran Out of Food in the Last Year: No   Transportation Needs: No Transportation Needs (1/13/2025)    NCSS - Transportation     Lack of Transportation: No   Housing Stability: At Risk (1/13/2025)    NCSS - Housing/Utilities     Has Housing: Yes     Worried About Losing Housing: No     Unable to Get Utilities: Yes              Review of Systems   Constitutional:  Positive for chills and fever.   HENT:  Negative for congestion, ear pain, rhinorrhea, sinus pressure, sinus pain and sore throat.    Respiratory:  Positive for shortness of breath. Negative for cough.    Cardiovascular:  Negative for chest pain.   Gastrointestinal:  Negative for abdominal pain.   Genitourinary:  Negative for dysuria, frequency and urgency.       Positive for stated complaint: fever,  shortness of breah, headache  Other systems are as noted in HPI.  Constitutional and vital signs reviewed.      All other systems reviewed and negative except as noted above.                  Physical Exam    ED Triage Vitals [06/23/25 1742]   /62   Pulse 97   Resp 18   Temp 99.9 °F (37.7 °C)   Temp src Oral   SpO2 96 %   O2 Device None (Room air)       Current Vitals:   Vital Signs  BP: 131/62  Pulse: 90  Resp: 18  Temp: 99.9 °F (37.7 °C)  Temp src: Oral    Oxygen Therapy  SpO2: 100 %  O2 Device: None (Room air)            Physical Exam  Vitals and nursing note reviewed.   Constitutional:       General: She is not in acute distress.     Appearance: Normal appearance. She is not ill-appearing.   HENT:      Head: Normocephalic and atraumatic.      Right Ear: Tympanic membrane, ear canal and external ear normal.      Left Ear: Tympanic membrane, ear canal and external ear normal.      Nose: No congestion or rhinorrhea.      Mouth/Throat:      Mouth: Mucous  membranes are moist.      Pharynx: Oropharynx is clear. No oropharyngeal exudate or posterior oropharyngeal erythema.   Eyes:      General: No scleral icterus.     Extraocular Movements: Extraocular movements intact.      Conjunctiva/sclera: Conjunctivae normal.      Pupils: Pupils are equal, round, and reactive to light.   Cardiovascular:      Rate and Rhythm: Normal rate and regular rhythm.      Heart sounds: Normal heart sounds. No murmur heard.     No friction rub. No gallop.   Pulmonary:      Effort: Pulmonary effort is normal. No respiratory distress.      Breath sounds: Normal breath sounds. No stridor. No wheezing, rhonchi or rales.   Musculoskeletal:      Cervical back: Normal range of motion and neck supple. No tenderness.   Lymphadenopathy:      Cervical: No cervical adenopathy.   Skin:     General: Skin is warm and dry.   Neurological:      General: No focal deficit present.      Mental Status: She is alert and oriented to person, place, and time. Mental status is at baseline.      Cranial Nerves: Cranial nerves 2-12 are intact. No cranial nerve deficit, dysarthria or facial asymmetry.      Sensory: Sensation is intact. No sensory deficit.      Motor: Motor function is intact. No weakness or pronator drift.      Coordination: Coordination is intact.      Gait: Gait is intact.   Psychiatric:         Mood and Affect: Mood normal.         Behavior: Behavior normal.                 ED Course  Labs Reviewed   POCT GLUCOSE - Abnormal; Notable for the following components:       Result Value    POC Glucose  101 (*)     All other components within normal limits   POCT FLU TEST - Normal    Narrative:     This assay is a rapid molecular in vitro test utilizing nucleic acid amplification of influenza A and B viral RNA.   RAPID SARS-COV-2 BY PCR - Normal                            MDM  Patient is a 55-year-old female with a history of hyperlipidemia who presents to immediate care for evaluation of fevers, chills,  headache and shortness of breath x 4 days.  Patient provides a history.  She presents to the immediate care well-appearing with grossly normal and stable vital signs.  Physical exam is largely unremarkable and she has clear lungs to auscultation bilaterally and no focal neurologic deficits.  Discussed with patient that fever and chills can have multiple possible causes, and usually they are related to self-limiting viral illnesses.  Will check chest x-ray and urinalysis as well as Accu-Chek for glucose.  Will also check for COVID and influenza.    Chest x-ray was positive for left lower lobe pneumonia.  Rapid testing for COVID and influenza were negative.  Fingerstick glucose was grossly unremarkable at 101.  Discussed results of chest x-ray with patient as well as recommended treatment plan with amoxicillin and azithromycin as well as an albuterol inhaler for her symptoms of shortness of breath.  Recommend she follows up with her PCP in 1 week to ensure resolution of her symptoms.  ED and return precautions were also discussed.  Patient expressed understanding and agreement with plan.  All questions answered.        Medical Decision Making      Disposition and Plan     Clinical Impression:  1. Community acquired pneumonia of left lower lobe of lung    2. Encounter for laboratory testing for COVID-19 virus    3. Fever, unspecified fever cause         Disposition:  Discharge  6/23/2025  6:47 pm    Follow-up:  Saad Michael MD  [address] [place], [place] [id number] [phone]    In 1 week            Medications Prescribed:  Current Discharge Medication List        START taking these medications    Details   amoxicillin 500 MG Oral Tab Take 2 tablets (1,000 mg total) by mouth 3 (three) times daily for 10 days.  Qty: 60 tablet, Refills: 0      azithromycin (ZITHROMAX Z-PRIYA) 250 MG Oral Tab 500 mg once followed by 250 mg daily x 4 days  Qty: 6 tablet, Refills: 0      albuterol 108 (90 Base) MCG/ACT Inhalation Aero  Soln Inhale 2 puffs into the lungs every 4 (four) hours as needed for Wheezing.  Qty: 1 each, Refills: 0                   Supplementary Documentation:

## 2025-06-23 NOTE — ED INITIAL ASSESSMENT (HPI)
Pt with headache since Friday, chills and shortness of breath today. Pt reports feeling hot since yesterday, but did not take temperature. Pt reports taking 600mg of Advil at 0300 today. Pt denied chest pain.

## (undated) DEVICE — STERILE LATEX POWDER-FREE SURGICAL GLOVESWITH NITRILE COATING: Brand: PROTEXIS

## (undated) DEVICE — 35 ML SYRINGE REGULAR TIP: Brand: MONOJECT

## (undated) DEVICE — MEDI-VAC NON-CONDUCTIVE SUCTION TUBING 6MM X 1.8M (6FT.) L: Brand: CARDINAL HEALTH

## (undated) DEVICE — FORCEP RADIAL JAW 4

## (undated) DEVICE — NEEDLE CONTRAST INTERJECT 25G

## (undated) DEVICE — Device: Brand: CUSTOM PROCEDURE KIT

## (undated) DEVICE — Device: Brand: DEFENDO AIR/WATER/SUCTION AND BIOPSY VALVE

## (undated) DEVICE — CONMED SCOPE SAVER BITE BLOCK, 20X27 MM: Brand: SCOPE SAVER

## (undated) DEVICE — LINE MNTR ADLT SET O2 INTMD

## (undated) NOTE — LETTER
November 6, 2017     Leila [address] [place] [address]      Dear Zenaida Zapata:    Below are the results from your recent visit:   HPV negative   Resulted Orders   URINALYSIS NONAUTO W/O SCOPE   Result Value Ref Range    GLUCOSE (U

## (undated) NOTE — IP AVS SNAPSHOT
[address]  [address] [facility] ~ [phone]                Discharge Summary   5/16/2017    Mat Elias           Admission Information        Provider Department    5/16/2017 Darling Carroll MD [facility] 5w Abs Final Neut Abs Lymphocyte Abso Monocyte Absolu Eosinophil Abso Basophil Absolu    (05/16/17)  71 (05/16/17)  18 (05/16/17)  9 (05/16/17)  2 (05/16/17)  1 -- (05/16/17)  4.6 (05/16/17)  1.2 (05/16/17)  0.6 (05/16/17)  0.1 (05/16/17)  0.0      [employer] will allow you to access patient instructions from your recent visit,  view other health information, and more. To sign up or find more information, go to [email or web address] and click on the Sign Up Now link in the [employer] box.      Enter

## (undated) NOTE — MR AVS SNAPSHOT
[place]  [address]  [phone]               Thank you for choosing us for your health care visit with Brenda Tracy MD.  We are glad to serve you and happy to provide you with this [address]  [address] [place], [place]    [address]  [address] [place], [place]    It is the patient's responsibility to check with and follow their insurance company's guidelines for prior authorization weekend appointments for your exam are available. Walk-in patients are welcome for most exams. [facility]  Diagnostics Main [facility] Parking) (Yellow Parking)  [address]   [address] prior authorization is obtained in accordance with your insurance company's guidelines. Unauthorized care may be your financial responsibility. If you have questions, please call your plans customer service number located on your ID card.     To schedule Ph Enter your [facility] Activation Code exactly as it appears below along with your Zip Code and Date of Birth to complete the sign-up process. If you do not sign up before the expiration date, you must request a new code.     Your unique [facility] Access Code: [email or web address]

## (undated) NOTE — MR AVS SNAPSHOT
[facility] [place] [address] [address]  [id number] [phone]               Thank you for choosing us for your health care visit with Vero Chanel MD.  We are glad to serve you and happy to provide you with this summary of authorization numbers or be assured that none are required. You can then schedule your appointment. Failure to obtain required authorization numbers can create reimbursement difficulties for you.     Assoc Dx:  Colon cancer screening [Z12.11]          Medic Eat plenty of protein, keep the fat content low Sugars:  sodas and sports drinks, candies and desserts   Eat plenty of low-fat dairy products High fat meats and dairy   Choose whole grain products Foods high in sodium   Water is best for hydration Fast guera

## (undated) NOTE — LETTER
12/27/2018          To Whom It May Concern:    Charbel Francisco is currently under my medical care and may not return to work at this time. Please excuse Jayce Brink for 2 days. She may return to work on 12/22/2018.   Activity is restricted as follows: no

## (undated) NOTE — LETTER
November 7, 2017     [address] [place] [address]      Dear Alphonse Jeffries:    Below are the results from your recent visit:  Pap negative   HPV negative     Resulted Orders   URINALYSIS NONAUTO W/O SCOPE   Result Value Ref Ran NOTE:  The Pap smear is a screening test that aids in the detection of cervical cancer and its precursors. False negative and false positive results can occur. Regular sampling is recommended to minimize false negative results.       Case Report       Gy

## (undated) NOTE — MR AVS SNAPSHOT
[address]  [address] [facility]  [phone]               Thank you for choosing us for your health care visit with Vero Chanel MD.  We are glad to serve you and happy to provide you with this summar Instructions: To schedule a test at [facility] Scheduling at   [phone]. Wednesday March 15, 2017     Imaging:  XR SHOULDER, COMPLETE (MIN 2 VIEWS), RIGHT (CPT=73030)    Instructions:   To schedule a If you are confident that your benefit plan will not require a referral or authorization, such as [place] Medicaid, please feel free to schedule your appointment immediately.  However, if you are unsure about the requirements for authorization, please wait If you have questions, you can call [phone] to talk to our [facility] Staff. Remember, MyChart is NOT to be used for urgent needs. For medical emergencies, dial 911.            Visit [facility] online at  [email or web address]

## (undated) NOTE — LETTER
10/15/2021              [address] [address]         To Whom It May Concern,    Carla Mix was seen at my office today (10/15/2021). Please excuse Karen Mullen from work today.   If you have any qu

## (undated) NOTE — LETTER
12/19/17        [facility]  [address]      Dear Tommy Sotelo records indicate that you have outstanding lab work and or testing that was ordered for you and has not yet been completed:          ALT(SGPT) [E]

## (undated) NOTE — LETTER
12/27/2018          To Whom It May Concern:    Tee Blevins is currently under my medical care and may not return to work at this time. Please excuse Avery Granado for 2 days. She may return to work on 12/29/2018.   Activity is restricted as follows: no

## (undated) NOTE — LETTER
2/19/2020          To Whom It May Concern:    Rosalind Kearney is currently under my medical care and may not return to work at this time. Please excuse Tashi Lau for 3 days. She may return to work on 2/22/2020.   Activity is restricted as follows: none

## (undated) NOTE — LETTER
6/15/2018              Moni Crook        [address] [facility] [address]         To whom it may concern,    Moni Crook (:3/12/1970) is under my medical care.  She was seen today  with  diagnosis of left subconjunctival he

## (undated) NOTE — MR AVS SNAPSHOT
After Visit Summary   5/23/2024    Razia Sanford   MRN: [id number]           Visit Information     Date & Time  5/23/2024  3:40 PM Provider  Marissa Trinidad APRN Department  [facility] - OB/GYN Dept. Phone  [phone]      Your Vitals Were  Most recent update: 5/23/2024  3:18 PM    BP   100/65          Pulse   76          Ht   62\"          Wt   186 lb 6.4 oz          LMP   03/15/2016 (Approximate)             BMI   34.09 kg/m²         Allergies as of 5/23/2024  Review status set to Review Complete on 5/23/2024       Noted Reaction Type Reactions    Radiology Contrast Iodinated Dyes 11/01/2016    HIVES    And itching on back and legs      Diagnoses for This Visit    Well woman exam with routine gynecological exam   [450292]  -  Primary  Screening for cervical cancer   [634020]    Encounter for screening mammogram for malignant neoplasm of breast   [601960]    Obesity affecting pregnancy, antepartum, unspecified obesity type   [5663828]             We Ordered the Following     Normal Orders This Visit    [facility] Weight Management - Dr. Leroy Hardy [facility] EMMG 9 [72229538 CUSTOM]     Hpv Dna  High Risk , Thin Prep Collect [[id number] CUSTOM]     THINPREP PAP SMEAR ONLY [[id number] CUSTOM]     ThinPrep PAP Smear [[id number] CUSTOM]     Future Labs/Procedures Expected by Expires    Hpv Dna  High Risk , Thin Prep Collect [[id number] CUSTOM]  5/23/2024 5/23/2025    NAEEM MAGDA 2D+3D SCREENING BILAT (CPT=77067/37178) [COMBO CPT(R)]  5/23/2024 (Approximate) 5/23/2025    ThinPrep PAP Smear [[id number] CUSTOM]  5/23/2024 5/23/2025      Imaging Scheduling Instructions     Around May 23, 2024   Imaging:   NAEEM MAGDA 2D+3D SCREENING BILAT (CPT=77067/04498)    Instructions: Your order will generate a \"Scheduling Ticket\" that will be available in [employer] to schedule on your own at a time most convenient to you.      If you do not have a [employer] Account, or if you prefer to  speak with someone to schedule your appointment, please call [facility] Scheduling at [phone].                  Did you know that [facility] primary care physicians now offer Video Visits through [employer] for adult patients for a variety of conditions such as allergies, back pain and cold symptoms? Skip the drive and waiting room and online chat with a doctor face-to-face using your web-cam enabled computer or mobile device wherever you are. Video Visits cost $50 and can be paid hassle-free using a credit, debit, or health savings card.  Not active on [employer]? Ask us how to get signed up today!          If you receive a survey from [employer], please take a few minutes to complete it and provide feedback. We strive to deliver the best patient experience and are looking for ways to make improvements. Your feedback will help us do so. For more information on [employer], please visit [email or web address]           No text in SmartText           No text in SmartText

## (undated) NOTE — LETTER
3/14/2020              [address] [facility]         Dear Ms. Remus Goodell,    During your recent stomach endoscopy examination at [facility] on 2/29/2020, I found gastritis irritation in your stom